# Patient Record
Sex: MALE | Race: WHITE | Employment: OTHER | URBAN - METROPOLITAN AREA
[De-identification: names, ages, dates, MRNs, and addresses within clinical notes are randomized per-mention and may not be internally consistent; named-entity substitution may affect disease eponyms.]

---

## 2017-05-22 ENCOUNTER — HOSPITAL ENCOUNTER (OUTPATIENT)
Dept: LAB | Age: 72
Discharge: HOME | End: 2017-05-22
Payer: MEDICARE

## 2017-05-22 ENCOUNTER — HOSPITAL ENCOUNTER (OUTPATIENT)
Dept: OTHER | Age: 72
Discharge: HOME | End: 2017-05-22
Payer: MEDICARE

## 2017-05-22 ENCOUNTER — HOSPITAL ENCOUNTER (OUTPATIENT)
Dept: PREADMISSION TESTING | Age: 72
Discharge: HOME | End: 2017-05-22
Payer: MEDICARE

## 2017-05-22 DIAGNOSIS — M51.26 DISPLACEMENT OF LUMBAR INTERVERTEBRAL DISC WITHOUT MYELOPATHY: ICD-10-CM

## 2017-05-22 DIAGNOSIS — Z01.811 PRE-OP CHEST EXAM: ICD-10-CM

## 2017-05-22 DIAGNOSIS — Z01.818 PRE-OP TESTING: ICD-10-CM

## 2017-05-22 LAB
ABO + RH BLD: NORMAL
ALBUMIN SERPL BCP-MCNC: 3.9 G/DL (ref 3.4–5)
ALBUMIN/GLOB SERPL: 1.4 {RATIO} (ref 0.8–1.7)
ALP SERPL-CCNC: 67 U/L (ref 45–117)
ALT SERPL-CCNC: 36 U/L (ref 16–61)
ANION GAP BLD CALC-SCNC: 11 MMOL/L (ref 3–18)
APPEARANCE UR: CLEAR
APTT PPP: 22.2 SEC (ref 23–36.4)
AST SERPL W P-5'-P-CCNC: 22 U/L (ref 15–37)
ATRIAL RATE: 109 BPM
BASOPHILS # BLD AUTO: 0.1 K/UL (ref 0–0.06)
BASOPHILS # BLD: 1 % (ref 0–2)
BILIRUB SERPL-MCNC: 0.8 MG/DL (ref 0.2–1)
BILIRUB UR QL: ABNORMAL
BLOOD GROUP ANTIBODIES SERPL: NORMAL
BUN SERPL-MCNC: 22 MG/DL (ref 7–18)
BUN/CREAT SERPL: 18 (ref 12–20)
CALCIUM SERPL-MCNC: 9.6 MG/DL (ref 8.5–10.1)
CALCULATED P AXIS, ECG09: 66 DEGREES
CALCULATED R AXIS, ECG10: 12 DEGREES
CALCULATED T AXIS, ECG11: 51 DEGREES
CHLORIDE SERPL-SCNC: 101 MMOL/L (ref 100–108)
CO2 SERPL-SCNC: 23 MMOL/L (ref 21–32)
COLOR UR: ABNORMAL
CREAT SERPL-MCNC: 1.25 MG/DL (ref 0.6–1.3)
DIAGNOSIS, 93000: NORMAL
DIFFERENTIAL METHOD BLD: ABNORMAL
EOSINOPHIL # BLD: 0.2 K/UL (ref 0–0.4)
EOSINOPHIL NFR BLD: 3 % (ref 0–5)
ERYTHROCYTE [DISTWIDTH] IN BLOOD BY AUTOMATED COUNT: 13 % (ref 11.6–14.5)
GLOBULIN SER CALC-MCNC: 2.7 G/DL (ref 2–4)
GLUCOSE SERPL-MCNC: 220 MG/DL (ref 74–99)
GLUCOSE UR STRIP.AUTO-MCNC: >1000 MG/DL
HCT VFR BLD AUTO: 44.2 % (ref 36–48)
HGB BLD-MCNC: 15.2 G/DL (ref 13–16)
HGB UR QL STRIP: NEGATIVE
INR PPP: 0.9 (ref 0.8–1.2)
KETONES UR QL STRIP.AUTO: ABNORMAL MG/DL
LEUKOCYTE ESTERASE UR QL STRIP.AUTO: NEGATIVE
LYMPHOCYTES # BLD AUTO: 17 % (ref 21–52)
LYMPHOCYTES # BLD: 1.2 K/UL (ref 0.9–3.6)
MCH RBC QN AUTO: 31.8 PG (ref 24–34)
MCHC RBC AUTO-ENTMCNC: 34.4 G/DL (ref 31–37)
MCV RBC AUTO: 92.5 FL (ref 74–97)
MONOCYTES # BLD: 0.6 K/UL (ref 0.05–1.2)
MONOCYTES NFR BLD AUTO: 8 % (ref 3–10)
NEUTS SEG # BLD: 5.4 K/UL (ref 1.8–8)
NEUTS SEG NFR BLD AUTO: 71 % (ref 40–73)
NITRITE UR QL STRIP.AUTO: NEGATIVE
P-R INTERVAL, ECG05: 158 MS
PH UR STRIP: 5 [PH] (ref 5–8)
PLATELET # BLD AUTO: 190 K/UL (ref 135–420)
PMV BLD AUTO: 11.5 FL (ref 9.2–11.8)
POTASSIUM SERPL-SCNC: 4.6 MMOL/L (ref 3.5–5.5)
PROT SERPL-MCNC: 6.6 G/DL (ref 6.4–8.2)
PROT UR STRIP-MCNC: NEGATIVE MG/DL
PROTHROMBIN TIME: 11.4 SEC (ref 11.5–15.2)
Q-T INTERVAL, ECG07: 318 MS
QRS DURATION, ECG06: 68 MS
QTC CALCULATION (BEZET), ECG08: 428 MS
RBC # BLD AUTO: 4.78 M/UL (ref 4.7–5.5)
SODIUM SERPL-SCNC: 135 MMOL/L (ref 136–145)
SP GR UR REFRACTOMETRY: >1.03 (ref 1–1.03)
SPECIMEN EXP DATE BLD: NORMAL
UROBILINOGEN UR QL STRIP.AUTO: 1 EU/DL (ref 0.2–1)
VENTRICULAR RATE, ECG03: 109 BPM
WBC # BLD AUTO: 7.5 K/UL (ref 4.6–13.2)

## 2017-05-22 PROCEDURE — 85610 PROTHROMBIN TIME: CPT | Performed by: NEUROLOGICAL SURGERY

## 2017-05-22 PROCEDURE — 86900 BLOOD TYPING SEROLOGIC ABO: CPT | Performed by: NEUROLOGICAL SURGERY

## 2017-05-22 PROCEDURE — 36415 COLL VENOUS BLD VENIPUNCTURE: CPT | Performed by: NEUROLOGICAL SURGERY

## 2017-05-22 PROCEDURE — 81003 URINALYSIS AUTO W/O SCOPE: CPT | Performed by: NEUROLOGICAL SURGERY

## 2017-05-22 PROCEDURE — 93005 ELECTROCARDIOGRAM TRACING: CPT

## 2017-05-22 PROCEDURE — 85730 THROMBOPLASTIN TIME PARTIAL: CPT | Performed by: NEUROLOGICAL SURGERY

## 2017-05-22 PROCEDURE — 80053 COMPREHEN METABOLIC PANEL: CPT | Performed by: NEUROLOGICAL SURGERY

## 2017-05-22 PROCEDURE — 87086 URINE CULTURE/COLONY COUNT: CPT | Performed by: NEUROLOGICAL SURGERY

## 2017-05-22 PROCEDURE — 71020 XR CHEST PA LAT: CPT

## 2017-05-22 PROCEDURE — 85025 COMPLETE CBC W/AUTO DIFF WBC: CPT | Performed by: NEUROLOGICAL SURGERY

## 2017-05-22 RX ORDER — METFORMIN HYDROCHLORIDE 1000 MG/1
1000 TABLET ORAL 2 TIMES DAILY
COMMUNITY

## 2017-05-22 RX ORDER — LEVOTHYROXINE SODIUM 175 UG/1
175 TABLET ORAL
COMMUNITY
End: 2018-02-19

## 2017-05-22 NOTE — PERIOP NOTES
PAT - SURGICAL PRE-ADMISSION INSTRUCTIONS    NAME:  Delores Weathers                                                          TODAY'S DATE:  5/22/2017    SURGERY DATE:  5/30/2017                                  SURGERY ARRIVAL TIME:   0830    1. Do NOT eat or drink anything, including candy or gum, after MIDNIGHT on 05/29/17 , unless you have specific instructions from your Surgeon or Anesthesia Provider to do so. 2. No smoking on the day of surgery. 3. No alcohol 24 hours prior to the day of surgery. 4. No recreational drugs for one week prior to the day of surgery. 5. Leave all valuables, including money/purse, at home. 6. Remove all jewelry, nail polish, makeup (including mascara); no lotions, powders, deodorant, or perfume/cologne/after shave. 7. Glasses/Contact lenses and Dentures may be worn to the hospital.  They will be removed prior to surgery. 8. Call your doctor if symptoms of a cold or illness develop within 24 ours prior to surgery. 9. AN ADULT MUST DRIVE YOU HOME AFTER OUTPATIENT SURGERY. 10. If you are having an OUTPATIENT procedure, please make arrangements for a responsible adult to be with you for 24 hours after your surgery. 11. If you are admitted to the hospital, you will be assigned to a bed after surgery is complete. Normally a family member will not be able to see you until you are in your assigned bed. 15. Family is encouraged to accompany you to the hospital.  We ask visitors in the treatment area to be limited to ONE person at a time to ensure patient privacy. EXCEPTIONS WILL BE MADE AS NEEDED. 15. Children under 12 are discouraged from entering the treatment area and need to be supervised by an adult when in the waiting room. Special Instructions:    HOLD oral diabetic medication on the MORNING OF surgery. , HOLD metformin/glucophage dose starting the EVENING BEFORE the day of surgery.     Patient Prep:    use CHG solution    These surgical instructions were reviewed with Navin Mora during the PAT visit. A printed copy of the instructions was provided to Navin Cons. Directions: On the morning of surgery, please go to the [facility]. Enter the building from the [facility] entrance, 1st floor (next to the Emergency Room entrance). Take the elevator to the 2nd floor. Sign in at the Registration Desk.     If you have any questions and/or concerns, please do not hesitate to call:  (Prior to the day of surgery)  [facility] unit:  [phone]  (Day of surgery)  [facility] unit:  [phone]

## 2017-05-23 LAB
BACTERIA SPEC CULT: NORMAL
SERVICE CMNT-IMP: NORMAL

## 2017-05-26 ENCOUNTER — ANESTHESIA EVENT (OUTPATIENT)
Dept: SURGERY | Age: 72
End: 2017-05-26
Payer: MEDICARE

## 2017-05-30 ENCOUNTER — HOSPITAL ENCOUNTER (OUTPATIENT)
Age: 72
Setting detail: OBSERVATION
Discharge: HOME HEALTH | End: 2017-05-31
Attending: NEUROLOGICAL SURGERY | Admitting: NEUROLOGICAL SURGERY
Payer: MEDICARE

## 2017-05-30 ENCOUNTER — APPOINTMENT (OUTPATIENT)
Dept: GENERAL RADIOLOGY | Age: 72
End: 2017-05-30
Attending: NEUROLOGICAL SURGERY
Payer: MEDICARE

## 2017-05-30 ENCOUNTER — ANESTHESIA (OUTPATIENT)
Dept: SURGERY | Age: 72
End: 2017-05-30
Payer: MEDICARE

## 2017-05-30 PROBLEM — M51.16 LUMBAR DISC HERNIATION WITH RADICULOPATHY: Status: ACTIVE | Noted: 2017-05-30

## 2017-05-30 PROBLEM — M41.9 SCOLIOSIS DEFORMITY OF SPINE: Status: ACTIVE | Noted: 2017-05-30

## 2017-05-30 LAB
GLUCOSE BLD STRIP.AUTO-MCNC: 109 MG/DL (ref 70–110)
GLUCOSE BLD STRIP.AUTO-MCNC: 154 MG/DL (ref 70–110)

## 2017-05-30 PROCEDURE — 74011250636 HC RX REV CODE- 250/636

## 2017-05-30 PROCEDURE — 77030019908 HC STETH ESOPH SIMS -A: Performed by: NURSE ANESTHETIST, CERTIFIED REGISTERED

## 2017-05-30 PROCEDURE — 74011250637 HC RX REV CODE- 250/637: Performed by: ANESTHESIOLOGY

## 2017-05-30 PROCEDURE — 77030004391 HC BUR FLUT MEDT -C: Performed by: NEUROLOGICAL SURGERY

## 2017-05-30 PROCEDURE — 77030008683 HC TU ET CUF COVD -A: Performed by: NURSE ANESTHETIST, CERTIFIED REGISTERED

## 2017-05-30 PROCEDURE — 77030033138 HC SUT PGA STRATFX J&J -B: Performed by: NEUROLOGICAL SURGERY

## 2017-05-30 PROCEDURE — 77030018673: Performed by: NEUROLOGICAL SURGERY

## 2017-05-30 PROCEDURE — 77030034479 HC ADH SKN CLSR PRINEO J&J -B: Performed by: NEUROLOGICAL SURGERY

## 2017-05-30 PROCEDURE — 77030013079 HC BLNKT BAIR HGGR 3M -A: Performed by: NURSE ANESTHETIST, CERTIFIED REGISTERED

## 2017-05-30 PROCEDURE — 74011250636 HC RX REV CODE- 250/636: Performed by: NURSE ANESTHETIST, CERTIFIED REGISTERED

## 2017-05-30 PROCEDURE — 77030018846 HC SOL IRR STRL H20 ICUM -A: Performed by: NEUROLOGICAL SURGERY

## 2017-05-30 PROCEDURE — 74011000272 HC RX REV CODE- 272: Performed by: NEUROLOGICAL SURGERY

## 2017-05-30 PROCEDURE — 77030008477 HC STYL SATN SLP COVD -A: Performed by: NURSE ANESTHETIST, CERTIFIED REGISTERED

## 2017-05-30 PROCEDURE — 77030027138 HC INCENT SPIROMETER -A

## 2017-05-30 PROCEDURE — 74011250637 HC RX REV CODE- 250/637: Performed by: NEUROLOGICAL SURGERY

## 2017-05-30 PROCEDURE — 74011636637 HC RX REV CODE- 636/637: Performed by: NURSE ANESTHETIST, CERTIFIED REGISTERED

## 2017-05-30 PROCEDURE — 77030032490 HC SLV COMPR SCD KNE COVD -B: Performed by: NEUROLOGICAL SURGERY

## 2017-05-30 PROCEDURE — 77030003029 HC SUT VCRL J&J -B: Performed by: NEUROLOGICAL SURGERY

## 2017-05-30 PROCEDURE — 76010000162 HC OR TIME 1.5 TO 2 HR INTENSV-TIER 1: Performed by: NEUROLOGICAL SURGERY

## 2017-05-30 PROCEDURE — 74011000250 HC RX REV CODE- 250: Performed by: NEUROLOGICAL SURGERY

## 2017-05-30 PROCEDURE — 65270000029 HC RM PRIVATE

## 2017-05-30 PROCEDURE — 74011250637 HC RX REV CODE- 250/637: Performed by: NURSE ANESTHETIST, CERTIFIED REGISTERED

## 2017-05-30 PROCEDURE — 65390000012 HC CONDITION CODE 44 OBSERVATION

## 2017-05-30 PROCEDURE — 77030003028 HC SUT VCRL J&J -A: Performed by: NEUROLOGICAL SURGERY

## 2017-05-30 PROCEDURE — 77030020486 HC ELECTRD EMG KT NUVA -G1: Performed by: NEUROLOGICAL SURGERY

## 2017-05-30 PROCEDURE — 74011000250 HC RX REV CODE- 250

## 2017-05-30 PROCEDURE — C1729 CATH, DRAINAGE: HCPCS | Performed by: NEUROLOGICAL SURGERY

## 2017-05-30 PROCEDURE — 77030011640 HC PAD GRND REM COVD -A: Performed by: NEUROLOGICAL SURGERY

## 2017-05-30 PROCEDURE — 76210000016 HC OR PH I REC 1 TO 1.5 HR: Performed by: NEUROLOGICAL SURGERY

## 2017-05-30 PROCEDURE — 77030014647 HC SEAL FBRN TISSL BAXT -D: Performed by: NEUROLOGICAL SURGERY

## 2017-05-30 PROCEDURE — 74011250636 HC RX REV CODE- 250/636: Performed by: NEUROLOGICAL SURGERY

## 2017-05-30 PROCEDURE — 82962 GLUCOSE BLOOD TEST: CPT

## 2017-05-30 PROCEDURE — 74011250636 HC RX REV CODE- 250/636: Performed by: ANESTHESIOLOGY

## 2017-05-30 PROCEDURE — 74011000258 HC RX REV CODE- 258: Performed by: NEUROLOGICAL SURGERY

## 2017-05-30 PROCEDURE — 77010033678 HC OXYGEN DAILY

## 2017-05-30 PROCEDURE — 77030018836 HC SOL IRR NACL ICUM -A: Performed by: NEUROLOGICAL SURGERY

## 2017-05-30 PROCEDURE — 76060000034 HC ANESTHESIA 1.5 TO 2 HR: Performed by: NEUROLOGICAL SURGERY

## 2017-05-30 RX ORDER — SODIUM CHLORIDE 0.9 % (FLUSH) 0.9 %
5-10 SYRINGE (ML) INJECTION EVERY 8 HOURS
Status: DISCONTINUED | OUTPATIENT
Start: 2017-05-30 | End: 2017-05-31 | Stop reason: HOSPADM

## 2017-05-30 RX ORDER — SODIUM CHLORIDE 0.9 % (FLUSH) 0.9 %
5-10 SYRINGE (ML) INJECTION AS NEEDED
Status: DISCONTINUED | OUTPATIENT
Start: 2017-05-30 | End: 2017-05-31 | Stop reason: HOSPADM

## 2017-05-30 RX ORDER — FENTANYL CITRATE 50 UG/ML
50 INJECTION, SOLUTION INTRAMUSCULAR; INTRAVENOUS AS NEEDED
Status: DISCONTINUED | OUTPATIENT
Start: 2017-05-30 | End: 2017-05-31 | Stop reason: HOSPADM

## 2017-05-30 RX ORDER — DEXTROSE MONOHYDRATE 25 G/50ML
25-50 INJECTION, SOLUTION INTRAVENOUS AS NEEDED
Status: DISCONTINUED | OUTPATIENT
Start: 2017-05-30 | End: 2017-05-31 | Stop reason: HOSPADM

## 2017-05-30 RX ORDER — PREGABALIN 50 MG/1
50 CAPSULE ORAL ONCE
Status: COMPLETED | OUTPATIENT
Start: 2017-05-30 | End: 2017-05-30

## 2017-05-30 RX ORDER — VANCOMYCIN HYDROCHLORIDE 1 G/20ML
INJECTION, POWDER, LYOPHILIZED, FOR SOLUTION INTRAVENOUS AS NEEDED
Status: DISCONTINUED | OUTPATIENT
Start: 2017-05-30 | End: 2017-05-30 | Stop reason: HOSPADM

## 2017-05-30 RX ORDER — SODIUM CHLORIDE, SODIUM LACTATE, POTASSIUM CHLORIDE, CALCIUM CHLORIDE 600; 310; 30; 20 MG/100ML; MG/100ML; MG/100ML; MG/100ML
25 INJECTION, SOLUTION INTRAVENOUS CONTINUOUS
Status: DISCONTINUED | OUTPATIENT
Start: 2017-05-30 | End: 2017-05-30 | Stop reason: HOSPADM

## 2017-05-30 RX ORDER — LIDOCAINE HYDROCHLORIDE AND EPINEPHRINE 10; 10 MG/ML; UG/ML
INJECTION, SOLUTION INFILTRATION; PERINEURAL AS NEEDED
Status: DISCONTINUED | OUTPATIENT
Start: 2017-05-30 | End: 2017-05-30 | Stop reason: HOSPADM

## 2017-05-30 RX ORDER — CEFAZOLIN SODIUM 2 G/50ML
2 SOLUTION INTRAVENOUS ONCE
Status: COMPLETED | OUTPATIENT
Start: 2017-05-30 | End: 2017-05-30

## 2017-05-30 RX ORDER — DIAZEPAM 10 MG/2ML
5 INJECTION INTRAMUSCULAR ONCE
Status: COMPLETED | OUTPATIENT
Start: 2017-05-30 | End: 2017-05-30

## 2017-05-30 RX ORDER — ONDANSETRON 2 MG/ML
4 INJECTION INTRAMUSCULAR; INTRAVENOUS ONCE
Status: ACTIVE | OUTPATIENT
Start: 2017-05-30 | End: 2017-05-31

## 2017-05-30 RX ORDER — HYDROCODONE BITARTRATE AND ACETAMINOPHEN 5; 325 MG/1; MG/1
1 TABLET ORAL
Status: DISCONTINUED | OUTPATIENT
Start: 2017-05-30 | End: 2017-05-31 | Stop reason: HOSPADM

## 2017-05-30 RX ORDER — METFORMIN HYDROCHLORIDE 500 MG/1
1000 TABLET ORAL 2 TIMES DAILY
Status: DISCONTINUED | OUTPATIENT
Start: 2017-05-30 | End: 2017-05-31 | Stop reason: HOSPADM

## 2017-05-30 RX ORDER — HYDROMORPHONE HYDROCHLORIDE 2 MG/ML
0.5 INJECTION, SOLUTION INTRAMUSCULAR; INTRAVENOUS; SUBCUTANEOUS
Status: DISCONTINUED | OUTPATIENT
Start: 2017-05-30 | End: 2017-05-31 | Stop reason: HOSPADM

## 2017-05-30 RX ORDER — ROSUVASTATIN CALCIUM 10 MG/1
5 TABLET, COATED ORAL
Status: DISCONTINUED | OUTPATIENT
Start: 2017-05-30 | End: 2017-05-31 | Stop reason: HOSPADM

## 2017-05-30 RX ORDER — BUPIVACAINE HYDROCHLORIDE AND EPINEPHRINE 2.5; 5 MG/ML; UG/ML
INJECTION, SOLUTION EPIDURAL; INFILTRATION; INTRACAUDAL; PERINEURAL AS NEEDED
Status: DISCONTINUED | OUTPATIENT
Start: 2017-05-30 | End: 2017-05-30 | Stop reason: HOSPADM

## 2017-05-30 RX ORDER — SUCCINYLCHOLINE CHLORIDE 20 MG/ML
INJECTION INTRAMUSCULAR; INTRAVENOUS AS NEEDED
Status: DISCONTINUED | OUTPATIENT
Start: 2017-05-30 | End: 2017-05-30 | Stop reason: HOSPADM

## 2017-05-30 RX ORDER — PROPOFOL 10 MG/ML
INJECTION, EMULSION INTRAVENOUS AS NEEDED
Status: DISCONTINUED | OUTPATIENT
Start: 2017-05-30 | End: 2017-05-30 | Stop reason: HOSPADM

## 2017-05-30 RX ORDER — DIPHENHYDRAMINE HCL 25 MG
25 CAPSULE ORAL
Status: DISCONTINUED | OUTPATIENT
Start: 2017-05-30 | End: 2017-05-31 | Stop reason: HOSPADM

## 2017-05-30 RX ORDER — ONDANSETRON 2 MG/ML
INJECTION INTRAMUSCULAR; INTRAVENOUS AS NEEDED
Status: DISCONTINUED | OUTPATIENT
Start: 2017-05-30 | End: 2017-05-30 | Stop reason: HOSPADM

## 2017-05-30 RX ORDER — PANTOPRAZOLE SODIUM 40 MG/1
40 TABLET, DELAYED RELEASE ORAL DAILY
Status: DISCONTINUED | OUTPATIENT
Start: 2017-05-31 | End: 2017-05-31 | Stop reason: HOSPADM

## 2017-05-30 RX ORDER — LIDOCAINE HYDROCHLORIDE 10 MG/ML
0.1 INJECTION, SOLUTION EPIDURAL; INFILTRATION; INTRACAUDAL; PERINEURAL AS NEEDED
Status: DISCONTINUED | OUTPATIENT
Start: 2017-05-30 | End: 2017-05-30 | Stop reason: HOSPADM

## 2017-05-30 RX ORDER — GLIMEPIRIDE 2 MG/1
1 TABLET ORAL 2 TIMES DAILY
Status: DISCONTINUED | OUTPATIENT
Start: 2017-05-30 | End: 2017-05-31 | Stop reason: HOSPADM

## 2017-05-30 RX ORDER — MAGNESIUM SULFATE 100 %
4 CRYSTALS MISCELLANEOUS AS NEEDED
Status: DISCONTINUED | OUTPATIENT
Start: 2017-05-30 | End: 2017-05-31 | Stop reason: HOSPADM

## 2017-05-30 RX ORDER — HYDROMORPHONE HYDROCHLORIDE 1 MG/ML
INJECTION, SOLUTION INTRAMUSCULAR; INTRAVENOUS; SUBCUTANEOUS
Status: COMPLETED
Start: 2017-05-30 | End: 2017-05-30

## 2017-05-30 RX ORDER — HYDROMORPHONE HYDROCHLORIDE 1 MG/ML
1 INJECTION, SOLUTION INTRAMUSCULAR; INTRAVENOUS; SUBCUTANEOUS
Status: DISCONTINUED | OUTPATIENT
Start: 2017-05-30 | End: 2017-05-31 | Stop reason: HOSPADM

## 2017-05-30 RX ORDER — BISACODYL 5 MG
5 TABLET, DELAYED RELEASE (ENTERIC COATED) ORAL DAILY PRN
Status: DISCONTINUED | OUTPATIENT
Start: 2017-05-30 | End: 2017-05-31 | Stop reason: HOSPADM

## 2017-05-30 RX ORDER — INSULIN LISPRO 100 [IU]/ML
INJECTION, SOLUTION INTRAVENOUS; SUBCUTANEOUS ONCE
Status: COMPLETED | OUTPATIENT
Start: 2017-05-30 | End: 2017-05-30

## 2017-05-30 RX ORDER — SODIUM CHLORIDE, SODIUM LACTATE, POTASSIUM CHLORIDE, CALCIUM CHLORIDE 600; 310; 30; 20 MG/100ML; MG/100ML; MG/100ML; MG/100ML
50 INJECTION, SOLUTION INTRAVENOUS CONTINUOUS
Status: DISCONTINUED | OUTPATIENT
Start: 2017-05-30 | End: 2017-05-31 | Stop reason: HOSPADM

## 2017-05-30 RX ORDER — FAMOTIDINE 20 MG/1
20 TABLET, FILM COATED ORAL ONCE
Status: COMPLETED | OUTPATIENT
Start: 2017-05-30 | End: 2017-05-30

## 2017-05-30 RX ORDER — MIDAZOLAM HYDROCHLORIDE 1 MG/ML
INJECTION, SOLUTION INTRAMUSCULAR; INTRAVENOUS AS NEEDED
Status: DISCONTINUED | OUTPATIENT
Start: 2017-05-30 | End: 2017-05-30 | Stop reason: HOSPADM

## 2017-05-30 RX ORDER — ACETAMINOPHEN 500 MG
1000 TABLET ORAL ONCE
Status: COMPLETED | OUTPATIENT
Start: 2017-05-30 | End: 2017-05-30

## 2017-05-30 RX ORDER — FENTANYL CITRATE 50 UG/ML
INJECTION, SOLUTION INTRAMUSCULAR; INTRAVENOUS AS NEEDED
Status: DISCONTINUED | OUTPATIENT
Start: 2017-05-30 | End: 2017-05-30 | Stop reason: HOSPADM

## 2017-05-30 RX ORDER — INSULIN LISPRO 100 [IU]/ML
INJECTION, SOLUTION INTRAVENOUS; SUBCUTANEOUS ONCE
Status: ACTIVE | OUTPATIENT
Start: 2017-05-30 | End: 2017-05-31

## 2017-05-30 RX ORDER — HYDROMORPHONE HYDROCHLORIDE 1 MG/ML
INJECTION, SOLUTION INTRAMUSCULAR; INTRAVENOUS; SUBCUTANEOUS AS NEEDED
Status: DISCONTINUED | OUTPATIENT
Start: 2017-05-30 | End: 2017-05-30 | Stop reason: HOSPADM

## 2017-05-30 RX ORDER — TELMISARTAN 40 MG/1
20 TABLET ORAL DAILY
Status: DISCONTINUED | OUTPATIENT
Start: 2017-05-31 | End: 2017-05-31 | Stop reason: HOSPADM

## 2017-05-30 RX ORDER — TIZANIDINE 2 MG/1
2 TABLET ORAL
Status: DISCONTINUED | OUTPATIENT
Start: 2017-05-30 | End: 2017-05-31 | Stop reason: HOSPADM

## 2017-05-30 RX ORDER — LIDOCAINE HYDROCHLORIDE 20 MG/ML
INJECTION, SOLUTION EPIDURAL; INFILTRATION; INTRACAUDAL; PERINEURAL AS NEEDED
Status: DISCONTINUED | OUTPATIENT
Start: 2017-05-30 | End: 2017-05-30 | Stop reason: HOSPADM

## 2017-05-30 RX ORDER — SODIUM CHLORIDE AND POTASSIUM CHLORIDE .9; .15 G/100ML; G/100ML
SOLUTION INTRAVENOUS CONTINUOUS
Status: DISCONTINUED | OUTPATIENT
Start: 2017-05-30 | End: 2017-05-31 | Stop reason: HOSPADM

## 2017-05-30 RX ADMIN — DIAZEPAM 5 MG: 5 INJECTION, SOLUTION INTRAMUSCULAR; INTRAVENOUS at 12:14

## 2017-05-30 RX ADMIN — SODIUM CHLORIDE AND POTASSIUM CHLORIDE: 9; 1.49 INJECTION, SOLUTION INTRAVENOUS at 19:51

## 2017-05-30 RX ADMIN — ONDANSETRON 4 MG: 2 INJECTION INTRAMUSCULAR; INTRAVENOUS at 14:56

## 2017-05-30 RX ADMIN — CEFAZOLIN SODIUM 2 G: 2 SOLUTION INTRAVENOUS at 13:54

## 2017-05-30 RX ADMIN — CEFAZOLIN SODIUM 1 G: 1 INJECTION, POWDER, FOR SOLUTION INTRAMUSCULAR; INTRAVENOUS at 22:30

## 2017-05-30 RX ADMIN — HYDROMORPHONE HYDROCHLORIDE 0.5 MG: 1 INJECTION, SOLUTION INTRAMUSCULAR; INTRAVENOUS; SUBCUTANEOUS at 16:25

## 2017-05-30 RX ADMIN — SODIUM CHLORIDE AND POTASSIUM CHLORIDE: 9; 1.49 INJECTION, SOLUTION INTRAVENOUS at 18:51

## 2017-05-30 RX ADMIN — SODIUM CHLORIDE, SODIUM LACTATE, POTASSIUM CHLORIDE, AND CALCIUM CHLORIDE 50 ML/HR: 600; 310; 30; 20 INJECTION, SOLUTION INTRAVENOUS at 15:50

## 2017-05-30 RX ADMIN — HYDROMORPHONE HYDROCHLORIDE 0.2 MG: 1 INJECTION, SOLUTION INTRAMUSCULAR; INTRAVENOUS; SUBCUTANEOUS at 15:28

## 2017-05-30 RX ADMIN — PREGABALIN 50 MG: 50 CAPSULE ORAL at 12:13

## 2017-05-30 RX ADMIN — ROSUVASTATIN CALCIUM 5 MG: 10 TABLET, FILM COATED ORAL at 22:35

## 2017-05-30 RX ADMIN — ACETAMINOPHEN 1000 MG: 500 TABLET ORAL at 12:13

## 2017-05-30 RX ADMIN — HYDROMORPHONE HYDROCHLORIDE 1 MG: 1 INJECTION, SOLUTION INTRAMUSCULAR; INTRAVENOUS; SUBCUTANEOUS at 23:59

## 2017-05-30 RX ADMIN — SODIUM CHLORIDE, SODIUM LACTATE, POTASSIUM CHLORIDE, AND CALCIUM CHLORIDE 25 ML/HR: 600; 310; 30; 20 INJECTION, SOLUTION INTRAVENOUS at 09:43

## 2017-05-30 RX ADMIN — METFORMIN HYDROCHLORIDE 1000 MG: 500 TABLET, FILM COATED ORAL at 19:51

## 2017-05-30 RX ADMIN — FENTANYL CITRATE 100 MCG: 50 INJECTION, SOLUTION INTRAMUSCULAR; INTRAVENOUS at 13:44

## 2017-05-30 RX ADMIN — FENTANYL CITRATE 50 MCG: 50 INJECTION, SOLUTION INTRAMUSCULAR; INTRAVENOUS at 15:41

## 2017-05-30 RX ADMIN — FENTANYL CITRATE 50 MCG: 50 INJECTION, SOLUTION INTRAMUSCULAR; INTRAVENOUS at 15:51

## 2017-05-30 RX ADMIN — PROPOFOL 200 MG: 10 INJECTION, EMULSION INTRAVENOUS at 13:44

## 2017-05-30 RX ADMIN — FENTANYL CITRATE 50 MCG: 50 INJECTION, SOLUTION INTRAMUSCULAR; INTRAVENOUS at 15:23

## 2017-05-30 RX ADMIN — FAMOTIDINE 20 MG: 20 TABLET, FILM COATED ORAL at 09:45

## 2017-05-30 RX ADMIN — HYDROMORPHONE HYDROCHLORIDE 0.5 MG: 1 INJECTION, SOLUTION INTRAMUSCULAR; INTRAVENOUS; SUBCUTANEOUS at 16:02

## 2017-05-30 RX ADMIN — LIDOCAINE HYDROCHLORIDE 60 MG: 20 INJECTION, SOLUTION EPIDURAL; INFILTRATION; INTRACAUDAL; PERINEURAL at 13:44

## 2017-05-30 RX ADMIN — MIDAZOLAM HYDROCHLORIDE 2 MG: 1 INJECTION, SOLUTION INTRAMUSCULAR; INTRAVENOUS at 13:41

## 2017-05-30 RX ADMIN — HYDROMORPHONE HYDROCHLORIDE 0.4 MG: 1 INJECTION, SOLUTION INTRAMUSCULAR; INTRAVENOUS; SUBCUTANEOUS at 14:18

## 2017-05-30 RX ADMIN — SUCCINYLCHOLINE CHLORIDE 100 MG: 20 INJECTION INTRAMUSCULAR; INTRAVENOUS at 13:44

## 2017-05-30 RX ADMIN — INSULIN LISPRO 2 UNITS: 100 INJECTION, SOLUTION INTRAVENOUS; SUBCUTANEOUS at 09:54

## 2017-05-30 RX ADMIN — HYDROMORPHONE HYDROCHLORIDE 0.4 MG: 1 INJECTION, SOLUTION INTRAMUSCULAR; INTRAVENOUS; SUBCUTANEOUS at 15:26

## 2017-05-30 NOTE — OP NOTES
Christo Sen    Name:  Sonido Ross  MR#:  [id number]  :  1945  Account #:  [de-identified]  Date of Adm:  2017  Date of Surgery:  2017      PREOPERATIVE DIAGNOSES  1. Left L4-L5 herniated nucleus pulposus. 2. Scoliosis. POSTOPERATIVE DIAGNOSES  1. Left L4-L5 herniated nucleus pulposus. 2. Scoliosis. PROCEDURES PERFORMED: Left L4-5 diskectomy. SURGEON: Carmen Gomez MD    ASSISTANT: Bella Gutierrez    ANESTHESIA: General endotracheal.    ESTIMATED BLOOD LOSS: Minimal.    COMPLICATIONS: No complications. SPECIMENS REMOVED: none    BRIEF HISTORY: (Please see admitting history and physical for full  details). This patient is a 79-year-old gentleman who presents with  intractable left lower extremity pain. He has a history of L3-L4  diskectomy followed by fusion. He had good relief of pain until recently. MRI revealed disk herniation which is enlarged at L4-5 on the left. Significant nerve root compromise. He has again failed conservative  therapy. DESCRIPTION OF PROCEDURE: After informed consent was  obtained, the patient was taken back to the operating room and placed  under general anesthesia without event. A Matias catheter was placed  due to the brevity of the operation. Impulse provided monitoring and  placed the electrodes. He was rolled in prone position on the operating  room table with his arms in the up position. All pressure points were  padded. The back was shaved with an electric razor. It was cleaned with  alcohol soaked 4 x 4's. A chlorhexidine scrub was performed and  blotted dry with a sterile towel. ChloraPrep was applied and allowed to  dry. Based on imaging, and the position of the incision, the decision  was made to proceed with a midline approach, mini open. The area  was infiltrated with 1% lidocaine with epinephrine.  A #10 blade was  used to incise the skin right over L4-5 as judged by fluoroscopy. Subperiosteal takedown was carried out. A small amount of the medial  facet was taken. Every attempt was made to preserve as much bone  as possible. Laminotomy was performed. Ligamentectomy was  performed. Under the operating microscope, the nerve root was  retracted and disk protrusion was seen. It was incised in a longitudinal  fashion and several medium sized fragments were removed. There  was one more that was more medial and this was gently dissected  away. At the end of the decompression, the nerve root was seen to lie  slack along its course. The annulus was tightened with the bipolar at  the end of the decompression. FloSeal was applied and the excess  was irrigated away. A small Gelfoam duvet was placed. The wound  was then closed with a row of simple interrupted 0 Vicryl sutures after  placing a small amount of vancomycin powder in the wound. The skin  was closed in layered fashion, and followed by Stratafix  in subcutaneous layer. Prineo was applied and allowed to dry. Marcaine was circumferentially injected around the wound. The patient  tolerated the procedure well. All counts were correct before closing. Free running EMGs were quiet.         MD CHARMAINE Radford / Sonja Moody  D:  05/30/2017   15:18  T:  05/30/2017   17:04  Job #:  [id number]

## 2017-05-30 NOTE — ANESTHESIA POSTPROCEDURE EVALUATION
Post-Anesthesia Evaluation and Assessment    Patient: Nathaniel Muro MRN: [id number]  SSN: [id number]    YOB: 1945  Age: 67 y.o. Sex: male       Cardiovascular Function/Vital Signs  Visit Vitals    /71    Pulse 78    Temp 36.6 °C (97.8 °F)    Resp 13    Ht 5' 10\" (1.778 m)    Wt 85.9 kg (189 lb 5 oz)    SpO2 96%    BMI 27.16 kg/m2       Patient is status post general anesthesia for Procedure(s):  left l4-5  MINIMALLY INVASIVE discectomy. Nausea/Vomiting: None    Postoperative hydration reviewed and adequate. Pain:  Pain Scale 1: Visual (05/30/17 1640)  Pain Intensity 1: 0 (05/30/17 1640)   Managed    Neurological Status:   Neuro (WDL): Within Defined Limits (05/30/17 1625)  Neuro  LUE Motor Response: Purposeful;Spontaneous  (05/30/17 1625)  LLE Motor Response: Purposeful;Spontaneous  (05/30/17 1625)  RUE Motor Response: Purposeful;Spontaneous  (05/30/17 1625)  RLE Motor Response: Purposeful;Spontaneous  (05/30/17 1625)   At baseline    Mental Status and Level of Consciousness: Alert and oriented     Pulmonary Status:   O2 Device: Room air (05/30/17 1555)   Adequate oxygenation and airway patent    Complications related to anesthesia: None    Post-anesthesia assessment completed.  No concerns    Signed By: Clyde Arenas CRNA     May 30, 2017

## 2017-05-30 NOTE — ROUTINE PROCESS
Bedside and Verbal shift change report given to orly cardenas   (oncoming nurse) by Elmira Alexander RN   (offgoing nurse). Report included the following information SBAR, Kardex, Intake/Output and Recent Results.

## 2017-05-30 NOTE — PROGRESS NOTES
1718  Received pt  From RR, alert and oriented, moving all extremities, denies tingling and numbness, due to void no pain at this time. 1853  Assisted to stand up at the  edge of the bed to void,can not  Void on the bed, walks few steps while assisting him back to bed, at this time sitting at the edge of the bed for dinner.

## 2017-05-30 NOTE — PERIOP NOTES
Pt arrived from OR via stretcher; NAD, VSS, breathing even and unlabored; connected to monitor. MAR and anesthesia reports received and acknowledged; will continue to monitor. Morenita [id number] - Attempted to update pt's spouse; volunteer stated that no one was answering when she called out; will try again shortly. [id number] - Able to update spouse, Harley Ferguson, with pt's condition and room #.     1700 - TRANSFER - OUT REPORT:    Verbal report given to REINIER Velasco (name) on [employer]  being transferred to 2200 (unit) for routine post - op       Report consisted of patients Situation, Background, Assessment and   Recommendations(SBAR). Information from the following report(s) SBAR, Procedure Summary, Intake/Output, MAR, Med Rec Status and Cardiac Rhythm normal sinus was reviewed with the receiving nurse. Lines:   Peripheral IV 05/30/17 Left Wrist (Active)   Site Assessment Clean, dry, & intact 5/30/2017  5:00 PM   Phlebitis Assessment 0 5/30/2017  5:00 PM   Infiltration Assessment 0 5/30/2017  5:00 PM   Dressing Status Clean, dry, & intact 5/30/2017  5:00 PM   Dressing Type Tape;Transparent 5/30/2017  5:00 PM   Hub Color/Line Status Pink; Infusing 5/30/2017  5:00 PM   Action Taken Open ports on tubing capped 5/30/2017  3:25 PM   Alcohol Cap Used Yes 5/30/2017  3:25 PM        Opportunity for questions and clarification was provided.       Patient transported with:   [facility]

## 2017-05-30 NOTE — PROGRESS NOTES
TRANSFER - IN REPORT:    Verbal report received from [facility] on Ray Neth  being received from [place] for routine post - op      Report consisted of patients Situation, Background, Assessment and   Recommendations(SBAR). Information from the following report(s) SBAR, Kardex, OR Summary, Intake/Output and MAR was reviewed with the receiving nurse. Opportunity for questions and clarification was provided. Assessment completed upon patients arrival to unit and care assumed. Pt received via bed, alert and oriented, able to turn in bed. Dressing on back dry, ice pack in place. Oriented to call bell,phone and incentive spirometer.

## 2017-05-30 NOTE — ANESTHESIA PREPROCEDURE EVALUATION
Anesthetic History   No history of anesthetic complications            Review of Systems / Medical History  Patient summary reviewed and pertinent labs reviewed    Pulmonary  Within defined limits      Sleep apnea           Neuro/Psych   Within defined limits           Cardiovascular  Within defined limits  Hypertension              Exercise tolerance: >4 METS     GI/Hepatic/Renal  Within defined limits   GERD           Endo/Other    Diabetes: type 2  Hypothyroidism  Arthritis     Other Findings   Comments:   Risk Factors for Postoperative nausea/vomiting:       History of postoperative nausea/vomiting? NO       Female? NO       Motion sickness? NO       Intended opioid administration for postoperative analgesia? YES      Smoking Abstinence  Current Smoker? NO  Elective Surgery? YES  Seen preoperatively by anesthesiologist or proxy prior to day of surgery? YES  Pt abstained from smoking 24 hours prior to anesthesia?  YES               Physical Exam    Airway  Mallampati: II  TM Distance: 4 - 6 cm  Neck ROM: normal range of motion   Mouth opening: Normal     Cardiovascular    Rhythm: regular  Rate: normal         Dental    Dentition: Poor dentition  Comments: Most teeth are ground down   Pulmonary  Breath sounds clear to auscultation               Abdominal  GI exam deferred       Other Findings            Anesthetic Plan    ASA: 3  Anesthesia type: general          Induction: Intravenous  Anesthetic plan and risks discussed with: Patient

## 2017-05-30 NOTE — IP AVS SNAPSHOT
[address] Patient: Arti House MRN: [id number] VQ You are allergic to the following Allergen Reactions Oxycodone Nausea and Vomiting Recent Documentation Height Weight BMI Smoking Status 1.778 m 85.9 kg 27.16 kg/m2 Former Smoker Emergency Contacts Name Discharge Info Relation Home Work Mobile Ada Vicente DISCHARGE CAREGIVER [3] Spouse [3]   [phone] About your hospitalization You were admitted on:  May 30, 2017 You last received care in the:  [address] [place] [address],[address] You were discharged on:  May 31, 2017 Unit phone number:  [phone] Why you were hospitalized Your primary diagnosis was:  Not on File Your diagnoses also included:  Scoliosis Deformity Of Spine, Lumbar Disc Herniation With Radiculopathy Providers Seen During Your Hospitalizations Provider Role Specialty Primary office phone Lane Quinones MD Attending Provider Neurosurgery [phone] Your Primary Care Physician (PCP) Primary Care Physician Office Phone Office Fax [facility] [phone] Follow-up Information Follow up With Details Comments Contact Info Margie Riley MD   [address] [place] [address] [phone] Current Discharge Medication List  
  
START taking these medications Dose & Instructions Dispensing Information Comments Morning Noon Evening Bedtime HYDROcodone-acetaminophen 5-325 mg per tablet Commonly known as:  Ceil Heap Your last dose was: Your next dose is:    
   
   
 Dose:  1 Tab Take 1 Tab by mouth every six (6) hours as needed. Max Daily Amount: 4 Tabs. Quantity:  28 Tab Refills:  None  
     
   
   
   
  
 tiZANidine 2 mg tablet Commonly known as:  Alysia Rye Your last dose was: Your next dose is:    
   
   
 Dose:  4 mg Take 2 Tabs by mouth every six (6) hours as needed. Indications: MUSCLE SPASM Quantity:  20 Tab Refills:  NONE CONTINUE these medications which have NOT CHANGED Dose & Instructions Dispensing Information Comments Morning Noon Evening Bedtime  
 aspirin 81 mg tablet Your last dose was: Your next dose is:    
   
   
 Dose:  81 mg Take 81 mg by mouth. Refills:  0 CENTRUM SILVER Tab tablet Generic drug:  multivitamins-minerals-lutein Your last dose was: Your next dose is: Take  by mouth. Refills:  0  
     
   
   
   
  
 CRESTOR 5 mg tablet Generic drug:  rosuvastatin Your last dose was: Your next dose is: Take  by mouth nightly. Refills:  0  
     
   
   
   
  
 FISH OIL 1,000 mg Cap Generic drug:  omega-3 fatty acids-vitamin e Your last dose was: Your next dose is:    
   
   
 Dose:  1 Cap Take 1 capsule by mouth. Refills:  0  
     
   
   
   
  
 glimepiride 1 mg tablet Commonly known as:  AMARYL Your last dose was: Your next dose is:    
   
   
 Dose:  1 mg Take 1 mg by mouth two (2) times a day. Refills:  0  
     
   
   
   
  
 levothyroxine 175 mcg tablet Commonly known as:  SYNTHROID Your last dose was: Your next dose is:    
   
   
 Dose:  175 mcg Take 175 mcg by mouth Daily (before breakfast). Refills:  0  
     
   
   
   
  
 metFORMIN 1,000 mg tablet Commonly known as:  GLUCOPHAGE Your last dose was: Your next dose is:    
   
   
 Dose:  1000 mg Take 1,000 mg by mouth two (2) times a day. Refills:  0 MICARDIS 20 mg tablet Generic drug:  telmisartan Your last dose was: Your next dose is: Take  by mouth daily. Refills:  0 MOTRIN 800 mg tablet Generic drug:  ibuprofen Your last dose was: Your next dose is: Take  by mouth every six (6) hours as needed. Refills:  0 PriLOSEC 20 mg capsule Generic drug:  omeprazole Your last dose was: Your next dose is:    
   
   
 Dose:  20 mg Take 20 mg by mouth daily. Refills:  0 PROBIOTIC 4X 10-15 mg Tbec Generic drug:  B.infantis-B.ani-B.long-B.bifi Your last dose was: Your next dose is: Take  by mouth. Refills:  0  
     
   
   
   
  
 PROSTATE THERAPY PO Your last dose was: Your next dose is: Take  by mouth. Refills:  0  
     
   
   
   
  
 temazepam 30 mg capsule Commonly known as:  RESTORIL Your last dose was: Your next dose is: Take  by mouth nightly as needed for Sleep. Refills:  0 Where to Get Your Medications Information on where to get these meds will be given to you by the nurse or doctor. ! Ask your nurse or doctor about these medications HYDROcodone-acetaminophen 5-325 mg per tablet  
 tiZANidine 2 mg tablet Discharge Instructions DISCHARGE SUMMARY from Nurse The following personal items are in your possession at time of discharge: 
 
Dental Appliances: None Visual Aid: Glasses PATIENT INSTRUCTIONS: 
 
 
F-face looks uneven A-arms unable to move or move unevenly S-speech slurred or non-existent T-time-call 911 as soon as signs and symptoms begin-DO NOT go Back to bed or wait to see if you get better-TIME IS BRAIN. Warning Signs of HEART ATTACK Call 911 if you have these symptoms: 
? Chest discomfort. Most heart attacks involve discomfort in the center of the chest that lasts more than a few minutes, or that goes away and comes back. It can feel like uncomfortable pressure, squeezing, fullness, or pain. ? Discomfort in other areas of the upper body. Symptoms can include pain or discomfort in one or both arms, the back, neck, jaw, or stomach. ? Shortness of breath with or without chest discomfort. ? Other signs may include breaking out in a cold sweat, nausea, or lightheadedness. Don't wait more than five minutes to call [address]! Fast action can save your life. Calling 911 is almost always the fastest way to get lifesaving treatment. Emergency Medical Services staff can begin treatment when they arrive  up to an hour sooner than if someone gets to the hospital by car. The discharge information has been reviewed with the patient. The patient verbalized understanding. Discharge medications reviewed with the patient and appropriate educational materials and side effects teaching were provided. Patient armband removed and shredded. Discharge Orders Procedure Order Date Status Priority Quantity Spec Type Associated Dx 200 [facility] 05/31/17 1122 Normal Routine 1 Comments:  PT for mobility and safety: Bedside commode Introducing [facility]! Latoya Lea introduces [email or web address] patient portal. Now you can access parts of your medical record, email your doctor's office, and request medication refills online. 1. In your internet browser, go to [email or web address] 2. Click on the First Time User? Click Here link in the Sign In box. You will see the New Member Sign Up page. 3. Enter your [employer] Access Code exactly as it appears below. You will not need to use this code after youve completed the sign-up process. If you do not sign up before the expiration date, you must request a new code. · [employer] Access Code: [email or web address] Expires: 8/28/2017  8:26 AM 
 
4. Enter the last four digits of your Social Security Number (xxxx) and Date of Birth (mm/dd/yyyy) as indicated and click Submit. You will be taken to the next sign-up page. 5. Create a [employer] ID. This will be your [employer] login ID and cannot be changed, so think of one that is secure and easy to remember. 6. Create a [employer] password. You can change your password at any time. 7. Enter your Password Reset Question and Answer. This can be used at a later time if you forget your password. 8. Enter your e-mail address. You will receive e-mail notification when new information is available in [address] 9. Click Sign Up. You can now view and download portions of your medical record. 10. Click the Download Summary menu link to download a portable copy of your medical information. If you have questions, please visit the Frequently Asked Questions section of the [employer] website. Remember, [employer] is NOT to be used for urgent needs. For medical emergencies, dial 911. Now available from your iPhone and Android! General Information Please provide this summary of care documentation to your next provider. Patient Signature:  ____________________________________________________________ Date:  ____________________________________________________________  
  
Lilian Sinha Provider Signature:  ____________________________________________________________ Date:  ____________________________________________________________

## 2017-05-30 NOTE — BRIEF OP NOTE
BRIEF OPERATIVE NOTE    Date of Procedure: 5/30/2017   Preoperative Diagnosis: left l4-5 herniated nucleus pulposus  m51.26  Postoperative Diagnosis: left l4-5 herniated nucleus pulposus  m51.26    Procedure(s):  left l4-5  discectomy  Surgeon(s) and Role:     * Licha Lyles MD - Primary         Assistant Staff:       Surgical Staff:  Circ-1: Alisson Smith RN  Circ-2: John Forte  Circ-Relief: Maksim Mederos  Radiology Technician: Gabby Liao  Scrub Tech-1: Laura Claudia  Surg Asst-1: Lizette Screws  Event Time In   Incision Start 1424   Incision Close      Anesthesia: General   Estimated Blood Loss: min  Specimens: * No specimens in log *   Findings: as expected   Complications: none  Implants: * No implants in log *

## 2017-05-31 VITALS
TEMPERATURE: 98.4 F | DIASTOLIC BLOOD PRESSURE: 71 MMHG | WEIGHT: 189.31 LBS | OXYGEN SATURATION: 94 % | BODY MASS INDEX: 27.1 KG/M2 | RESPIRATION RATE: 18 BRPM | HEART RATE: 109 BPM | SYSTOLIC BLOOD PRESSURE: 114 MMHG | HEIGHT: 70 IN

## 2017-05-31 PROCEDURE — 99218 HC RM OBSERVATION: CPT

## 2017-05-31 PROCEDURE — 74011000258 HC RX REV CODE- 258: Performed by: NEUROLOGICAL SURGERY

## 2017-05-31 PROCEDURE — 77010033678 HC OXYGEN DAILY

## 2017-05-31 PROCEDURE — 74011250637 HC RX REV CODE- 250/637: Performed by: NEUROLOGICAL SURGERY

## 2017-05-31 PROCEDURE — 65390000012 HC CONDITION CODE 44 OBSERVATION

## 2017-05-31 PROCEDURE — 74011250636 HC RX REV CODE- 250/636: Performed by: NEUROLOGICAL SURGERY

## 2017-05-31 RX ORDER — HYDROCODONE BITARTRATE AND ACETAMINOPHEN 5; 325 MG/1; MG/1
1 TABLET ORAL
Qty: 28 TAB | Refills: 0 | Status: SHIPPED | OUTPATIENT
Start: 2017-05-31 | End: 2018-02-19

## 2017-05-31 RX ORDER — TIZANIDINE 2 MG/1
4 TABLET ORAL
Qty: 20 TAB | Refills: 0 | Status: SHIPPED | OUTPATIENT
Start: 2017-05-31 | End: 2018-02-19

## 2017-05-31 RX ADMIN — GLIMEPIRIDE 1 MG: 2 TABLET ORAL at 09:05

## 2017-05-31 RX ADMIN — HYDROCODONE BITARTRATE AND ACETAMINOPHEN 1 TABLET: 5; 325 TABLET ORAL at 09:53

## 2017-05-31 RX ADMIN — HYDROMORPHONE HYDROCHLORIDE 1 MG: 1 INJECTION, SOLUTION INTRAMUSCULAR; INTRAVENOUS; SUBCUTANEOUS at 05:59

## 2017-05-31 RX ADMIN — HYDROCODONE BITARTRATE AND ACETAMINOPHEN 1 TABLET: 5; 325 TABLET ORAL at 04:13

## 2017-05-31 RX ADMIN — CEFAZOLIN SODIUM 1 G: 1 INJECTION, POWDER, FOR SOLUTION INTRAMUSCULAR; INTRAVENOUS at 05:58

## 2017-05-31 RX ADMIN — METFORMIN HYDROCHLORIDE 1000 MG: 500 TABLET, FILM COATED ORAL at 09:04

## 2017-05-31 RX ADMIN — PANTOPRAZOLE SODIUM 40 MG: 40 TABLET, DELAYED RELEASE ORAL at 09:04

## 2017-05-31 RX ADMIN — TELMISARTAN 20 MG: 40 TABLET ORAL at 09:04

## 2017-05-31 RX ADMIN — LEVOTHYROXINE SODIUM 175 MCG: 75 TABLET ORAL at 09:05

## 2017-05-31 NOTE — PROGRESS NOTES
Patient has designated his wife to participate in his/her discharge plan and to receive any needed information.      Name:   Ruth Oreilly  spouse   [phone] [id number] , [place], [address]   May 31, 2017     Address:  Phone number:

## 2017-05-31 NOTE — PROGRESS NOTES
Mushtaq   Discharge Planning/ Assessment    Reasons for Intervention: Interviewed patient,  He agrees to share his discharge information with his wife, see below. He was independent prior to admission and see Dr River Falk for his primary care needs. His discharge plan is to return home with home care and is requesting a BSC.      High Risk Criteria  [x] Yes  []No   Physician Referral  [] Yes  [x]No        Date    Nursing Referral  [] Yes  [x]No        Date    Patient/Family Request  [] Yes  [x]No        Date       Resources:    Medicare  [x] Yes  []No   Medicaid  [] Yes  [x]No   No Resources  [] Yes  [x]No   Private Insurance  [x] Yes  []No    Name/Phone Number    Other  [] Yes  [x]No        (i.e. Workman's Comp)         Prior Services:    Prior Services  [] Yes  [x]No   Home Health  [] Yes  [x]No   [address]  [] Yes  [x]No        Number of [address]  [] Yes  [x]No       Meals on Wheels  [] Yes  [x]No   Office on Aging  [] Yes  [x]No   Transportation Services  [] Yes  [x]No   Nursing Home  [] Yes  [x]No        Nursing Home Name    [address]  [] Yes  [x]No        [address] Name    Other       Information Source:      Information obtained from  [x] Patient  [] Parent   [] [address]  [] Child  [] Spouse   [] Significant Other/Partner   [] Friend      [] EMS    [] Nursing Home Chart          [x] Other:chart   Chart Review  [x] Yes  []No     Family/Support System:    Patient lives with  [] Alone    [x] Spouse   [] Significant Other  [] Children  [] Caretaker   [] Parent  [] Sibling     [] Other       Other Support System:    Is the patient responsible for care of others  [] Yes  [x]No   Information of person caring for patient on  discharge self   Managers financial affairs independently  [x] Yes  []No   If no, explain:      Status Prior to Admission:    Mental Status  [x] Awake  [x] Alert  [x] Oriented  [] Quiet/Calm [] Lethargic/Sedated   [] Disoriented  [] Restless/Anxious  [] Combative   Personal Care  [] Dependent  [x] Independent Personal Care  [] Requires Assistance   Meal Preparation Ability  [x] Independent   [] Standby Assistance   [] Minimal Assistance   [] Moderate Assistance  [] Maximum Assistance     [] Total Assistance   Chores  [x] Independent with Chores   [] N/A Nursing Home Resident   [] Requires Assistance   Bowel/Bladder  [x] Continent  [] Catheter  [] Incontinent  [] Ostomy Self-Care    [] Urine Diversion Self-Care  [] Maximum Assistance     [] Total Assistance   Number of Persons needed for assistance    DME at home  [] Chase Kenney  [] Yfn Kenney   [] Commode    [] Bathroom/Grab Bars  [] Hospital Bed  [] Nebulizer  [] Oxygen           [] Raised Toilet Seat  [] Shower Chair  [] Side Rails for Bed   [] Tub Transfer Bench   [x] Evone Leaver  [] Kayley Severe, Standard      [] Other:   Vendor      Treatment Presently Receiving:    Current Treatments  [] Chemotherapy  [] Dialysis  [] Insulin  [] IVAB [x] IVF   [] O2  [] PCA   [x] PT   [] RT   [] Tube Feedings   [] Wound Care     Psychosocial Evaluation:    Verbalized Knowledge of Disease Process  [x] Patient  [x]Family   Coping with Disease Process  [x] Patient  [x]Family   Requires Further Counseling Coping with Disease Process  [] Patient  []Family     Identified Projected Needs:    Home Health Aid  [] Yes  [x]No   Transportation  [] Yes  [x]No   Education  [] Yes  [x]No        Specific Education     Financial Counseling  [] Yes  [x]No   Inability to Care for Self/Will Require 24 hour care  [] Yes  [x]No   Pain Management  [] Yes  [x]No   Home Infusion Therapy  [] Yes  [x]No   Oxygen Therapy  [] Yes  [x]No   DME  [] Yes  [x]No   Long Term Care Placement  [] Yes  [x]No   Rehab  [] Yes  [x]No   Physical Therapy  [x] Yes  []No   Needs Anticipated At This Time  [x] Yes  []No     Intra-Hospital Referral:    [facility]  [x] Yes  []No     [] Yes  [x]No Patient Representative  [] Yes  [x]No   Staff for Teaching Needs  [] Yes  [x]No   Specialty Teaching Needs     Diabetic Educator  [] Yes  [x]No   Referral for Diabetic Educator Needed  [] Yes  [x]No  If Yes, place order for Nutritionist or Diabetic Consult     Tentative Discharge Plan:    Home with No Services  [] Yes  [x]No   Home with Home Health Follow-up  [x] Yes  []No        If Yes, specify type Nursing/therapy   [address]  [] Yes  [x]No        If Yes, specify type    Meals on Wheels  [] Yes  [x]No   Office of Aging  [] Yes  [x]No   NHP  [] Yes  [x]No   Return to the Nursing Home  [] Yes  [x]No   Rehab Therapy  [] Yes  [x]No   Acute Rehab  [] Yes  [x]No   Subacute Rehab  [] Yes  [x]No   Private Care  [] Yes  [x]No   Substance Abuse Referral  [] Yes  [x]No   Transportation  [] Yes  [x]No   Chore Service  [] Yes  [x]No   Inpatient Hospice  [] Yes  [x]No   OP RT  [] Yes  [x] No   OP Hemo  [] Yes  [x] No   OP PT  [] Yes  [x]No   Support Group  [] Yes  [x]No   Reach to Recovery  [] Yes  [x]No   OP Oncology Clinic  [] Yes  [x]No   Clinic Appointment  [] Yes  [x]No   DME  [] Yes  [x]No   Comments    Name of D/C Planner or  Given to Patient or Family Alina Thompson RN   Phone Number [phone]   Date May 31, 2017   Time 748 am   If you are discharged home, whom do you designate to participate in your discharge plan and receive any information needed?      Enter name of Georgie ShahEdmodo  spouse        Phone # of designee         Address of [place], [id number]        Updated May 31, 2017        Patient refused to designate any           individual

## 2017-05-31 NOTE — PROGRESS NOTES
Care Management Interventions  Palliative Care Consult (Criteria: CHF and RRAT>21): No  Reason for No Palliative Care Consult: Patient declined palliative services at this time  Mode of Transport at Discharge:  Other (see comment)  Transition of Care Consult (CM Consult): [address]: No  Reason Outside [place]: Patient already serviced by other home care/hospice agency ([address])  [address]: No  Discharge Durable Medical Equipment: Yes (Bedside commode)  Physical Therapy Consult: No  Occupational Therapy Consult: No  Speech Therapy Consult: No  Current Support Network: Lives with Spouse  Confirm Follow Up Transport: Family  Plan discussed with Pt/Family/Caregiver: Yes  Freedom of Choice Offered: Yes  Discharge Location  Discharge Placement: Home with home health

## 2017-05-31 NOTE — DISCHARGE INSTRUCTIONS
DISCHARGE SUMMARY from Nurse    The following personal items are in your possession at time of discharge:    Dental Appliances: None  Visual Aid: Glasses                            PATIENT INSTRUCTIONS:    After general anesthesia or intravenous sedation, for 24 hours or while taking prescription Narcotics:  · Limit your activities  · Do not drive and operate hazardous machinery  · Do not make important personal or business decisions  · Do  not drink alcoholic beverages  · If you have not urinated within 8 hours after discharge, please contact your surgeon on call. Report the following to your surgeon:  · Excessive pain, swelling, redness or odor of or around the surgical area  · Temperature over 100.5  · Nausea and vomiting lasting longer than 4 hours or if unable to take medications  · Any signs of decreased circulation or nerve impairment to extremity: change in color, persistent  numbness, tingling, coldness or increase pain  · Any questions        What to do at Home:  Recommended activity: Activity as tolerated, no bending, lifting or twisting, and  no driving for today. If you experience any of the following symptoms increased pain, numbness or tingling in arms and legs, please follow up with Dr. Natalie Strong. *  Please give a list of your current medications to your Primary Care Provider. *  Please update this list whenever your medications are discontinued, doses are      changed, or new medications (including over-the-counter products) are added. *  Please carry medication information at all times in case of emergency situations. These are general instructions for a healthy lifestyle:    No smoking/ No tobacco products/ Avoid exposure to second hand smoke    Surgeon General's Warning:  Quitting smoking now greatly reduces serious risk to your health.     Obesity, smoking, and sedentary lifestyle greatly increases your risk for illness    A healthy diet, regular physical exercise & weight monitoring are important for maintaining a healthy lifestyle    You may be retaining fluid if you have a history of heart failure or if you experience any of the following symptoms:  Weight gain of 3 pounds or more overnight or 5 pounds in a week, increased swelling in our hands or feet or shortness of breath while lying flat in bed. Please call your doctor as soon as you notice any of these symptoms; do not wait until your next office visit. Recognize signs and symptoms of STROKE:    F-face looks uneven    A-arms unable to move or move unevenly    S-speech slurred or non-existent    T-time-call 911 as soon as signs and symptoms begin-DO NOT go       Back to bed or wait to see if you get better-TIME IS BRAIN. Warning Signs of HEART ATTACK     Call 911 if you have these symptoms:   Chest discomfort. Most heart attacks involve discomfort in the center of the chest that lasts more than a few minutes, or that goes away and comes back. It can feel like uncomfortable pressure, squeezing, fullness, or pain.  Discomfort in other areas of the upper body. Symptoms can include pain or discomfort in one or both arms, the back, neck, jaw, or stomach.  Shortness of breath with or without chest discomfort.  Other signs may include breaking out in a cold sweat, nausea, or lightheadedness. Don't wait more than five minutes to call 911 - MINUTES MATTER! Fast action can save your life. Calling 911 is almost always the fastest way to get lifesaving treatment. Emergency Medical Services staff can begin treatment when they arrive -- up to an hour sooner than if someone gets to the hospital by car. The discharge information has been reviewed with the patient. The patient verbalized understanding. Discharge medications reviewed with the patient and appropriate educational materials and side effects teaching were provided. Patient armband removed and shredded.

## 2017-05-31 NOTE — PROGRESS NOTES
conducted an Initial consultation and Spiritual Assessment for Kamaljit Hugo, who is a 67 y. o.,male. Patients Primary Language is: [place]. According to the patients EMR Mormonism Affiliation is: Holiness. The reason the Patient came to the hospital is:   Patient Active Problem List    Diagnosis Date Noted    Scoliosis deformity of spine 05/30/2017    Lumbar disc herniation with radiculopathy 05/30/2017    Elevated PSA 03/13/2012    Urinary incontinence 03/12/2012        The  provided the following Interventions:  Initiated a relationship of care and support. Explored issues of lance, belief, spirituality and Nondenominational needs while hospitalized. Patient confirmed his Nondenominational affiliation. Listened empathically to patient's Nondenominational and spiritual journey. Provided chaplaincy education and information about Spiritual Care Services. Offered assurance of continued prayer on patient's behalf. Prayer card provided. Chart reviewed. The following outcomes were achieved:  Patient shared limited information about his medical narrative. Patient processed feeling about current hospitalization. He said that he may be discharged today. Patient expressed gratitude for the 's visit. Assessment:  Patient did not indicate any other spiritual or Nondenominational issues which require Spiritual Care Services interventions at this time. Plan:  Chaplains will continue to follow and will provide pastoral care as needed or requested. The Rev.  [address] [facility], [facility] [address] [facility] 159  [facility] [phone] / [facility] [phone]

## 2017-05-31 NOTE — PROGRESS NOTES
Patient and/or next of kin has been given and has signed the [facility] Outpatient Observation  Notification letter and all questions answered. Copy of this notice given to patient and copy placed on chart. Patient and/or next of kin has been given the Outpatient Observation Information and Notification letter and all questions answered.

## 2017-05-31 NOTE — PROGRESS NOTES
Certified Rivas Reynaga provider rounded on Wen Campoverde to provide education related to sleep apnea after chart review for risk factors. Risk factors include:  1. Hypertension  2. Diabetes, Type II  3. GERD  4. Mallampati II  5. STOP BANG score 7  6. Oaklyn Sleepiness score 1    Provided patient with the following pamphlets:  1. What is Sleep Apnea  2. Sleep and Medical problems  3. Sleep & Your Heart  4. Common Sleep Problems for Older Adults  5. Tips For Sleep As You Age  10. Tips For Better Sleep In Older Adults    Patient Education:  1. Reviewed sleep hygiene & effects of poor sleep quality. 2. Reviewed relationship between sleep & heart health. 3. Reviewed relationship between sleep & age. Patient stated that he was scheduled to have a sleep study at another facility prior to being admitted. He has awakened himself gasping for air. Patient also wears a mouthpiece to help with grinding his teeth. Recommendations:  1. Referral to sleep specialist for evaluation if symptoms of poor sleep quality present. 2. Order baseline PSG testing to be arranged as an outpatient. 3. Follow up with sleep specialist for treatment and management.

## 2017-05-31 NOTE — PROGRESS NOTES
Patient has been changed from inpatient to observation and a Condition 44 has been completed based on Medicare criteria. The patient / or next of kin will be made aware of this change in status and given a copy of  the [facility] Outpatient Observation Information and Notification letter.

## 2017-05-31 NOTE — PROGRESS NOTES
Offered the pt FOC for home care, he chose [employer]. Pt verified the contact information on the face sheet is correct. Referral sent to them via [facility]. Pt reported he has a bedside commode in his attic. He will ask his son to find it and bring it to him. He reported he \"thought\" it had been maybe 10 years since he received it through his insurance. Informed him the bedside commode will be ordered for him but to expect he does need meet Medicare criteria for a bedside commode. He will self purchase a bedside commode in the community it the one ordered from [facility] is not delivered in 1-2 days.

## 2017-05-31 NOTE — PROGRESS NOTES
Pt sitting on side of bed. Complains of some pain but states pain meds help. Pt states he wants to go home today. States he has been using IS, but \"not every hour. I do it when I think about it and that's not very often\". Pt encouraged to use every hour to reduce risk of pneumonia. Verbalized understanding. Pt denies needs at this time.

## 2017-05-31 NOTE — PROGRESS NOTES
1945: Received patient in bed awake,alert and oriented x4. No signs of distress. Denies any pain at this time. Call bell within reach. Will continue monitoring. 0002: Ambulate in the hallway  with walker and  nursing assistant,tolerated well. 0007: Back in bed without any problems,medicated for pain ( see MAR) ,resting now. Call bell within reach. Will continue monitoring.

## 2017-06-01 NOTE — PROGRESS NOTES
0730 - received pt room. Pt rated 5/10 to back. AO. Resting in bed    0905 - assessment completed. Pt is AOx4. VSS. Pt up to bedside. IV fluid infusing. Call bell at bedside. No distress noted. 1236 - patient resting in bed, no distress noted.      1258 - pt d/c'd from floor, no distress noted

## 2017-07-14 ENCOUNTER — TELEPHONE (OUTPATIENT)
Dept: SLEEP MEDICINE | Age: 72
End: 2017-07-14

## 2017-07-14 NOTE — TELEPHONE ENCOUNTER
Patient stated that he has not been for a sleep study yet due to rehab from back surgery. Patient was scheduled to have a sleep study done prior to being admitted for surgery. Patient is scheduled to meet with the surgeon on 8/2/17 to evaluate progress.

## 2018-02-19 ENCOUNTER — HOSPITAL ENCOUNTER (OUTPATIENT)
Dept: PREADMISSION TESTING | Age: 73
Discharge: HOME | DRG: 460 | End: 2018-02-19
Payer: MEDICARE

## 2018-02-19 ENCOUNTER — HOSPITAL ENCOUNTER (OUTPATIENT)
Dept: LAB | Age: 73
Discharge: HOME | DRG: 460 | End: 2018-02-19
Payer: MEDICARE

## 2018-02-19 ENCOUNTER — HOSPITAL ENCOUNTER (OUTPATIENT)
Dept: OTHER | Age: 73
Discharge: HOME | DRG: 460 | End: 2018-02-19
Payer: MEDICARE

## 2018-02-19 ENCOUNTER — ANESTHESIA EVENT (OUTPATIENT)
Dept: SURGERY | Age: 73
DRG: 460 | End: 2018-02-19
Payer: MEDICARE

## 2018-02-19 DIAGNOSIS — M96.3 KYPHOSIS, POSTLAMINECTOMY: ICD-10-CM

## 2018-02-19 DIAGNOSIS — E11.9 TYPE II DIABETES MELLITUS (HCC): ICD-10-CM

## 2018-02-19 DIAGNOSIS — Z01.818 PRE-OP TESTING: ICD-10-CM

## 2018-02-19 LAB
ABO + RH BLD: NORMAL
APTT PPP: 27.5 SEC (ref 23–36.4)
BASOPHILS # BLD: 0 K/UL (ref 0–0.06)
BASOPHILS NFR BLD: 1 % (ref 0–2)
BLOOD GROUP ANTIBODIES SERPL: NORMAL
DIFFERENTIAL METHOD BLD: ABNORMAL
EOSINOPHIL # BLD: 0.2 K/UL (ref 0–0.4)
EOSINOPHIL NFR BLD: 2 % (ref 0–5)
ERYTHROCYTE [DISTWIDTH] IN BLOOD BY AUTOMATED COUNT: 12.9 % (ref 11.6–14.5)
HCT VFR BLD AUTO: 42.3 % (ref 36–48)
HGB BLD-MCNC: 14.4 G/DL (ref 13–16)
INR PPP: 1 (ref 0.8–1.2)
LYMPHOCYTES # BLD: 1.8 K/UL (ref 0.9–3.6)
LYMPHOCYTES NFR BLD: 22 % (ref 21–52)
MCH RBC QN AUTO: 31.9 PG (ref 24–34)
MCHC RBC AUTO-ENTMCNC: 34 G/DL (ref 31–37)
MCV RBC AUTO: 93.8 FL (ref 74–97)
MONOCYTES # BLD: 0.5 K/UL (ref 0.05–1.2)
MONOCYTES NFR BLD: 6 % (ref 3–10)
NEUTS SEG # BLD: 5.4 K/UL (ref 1.8–8)
NEUTS SEG NFR BLD: 69 % (ref 40–73)
PLATELET # BLD AUTO: 176 K/UL (ref 135–420)
PMV BLD AUTO: 11.8 FL (ref 9.2–11.8)
PROTHROMBIN TIME: 12.5 SEC (ref 11.5–15.2)
RBC # BLD AUTO: 4.51 M/UL (ref 4.7–5.5)
SPECIMEN EXP DATE BLD: NORMAL
WBC # BLD AUTO: 7.9 K/UL (ref 4.6–13.2)

## 2018-02-19 PROCEDURE — 85025 COMPLETE CBC W/AUTO DIFF WBC: CPT | Performed by: NEUROLOGICAL SURGERY

## 2018-02-19 PROCEDURE — 85610 PROTHROMBIN TIME: CPT | Performed by: NEUROLOGICAL SURGERY

## 2018-02-19 PROCEDURE — 85730 THROMBOPLASTIN TIME PARTIAL: CPT | Performed by: NEUROLOGICAL SURGERY

## 2018-02-19 PROCEDURE — 36415 COLL VENOUS BLD VENIPUNCTURE: CPT | Performed by: NEUROLOGICAL SURGERY

## 2018-02-19 PROCEDURE — 86900 BLOOD TYPING SEROLOGIC ABO: CPT | Performed by: NEUROLOGICAL SURGERY

## 2018-02-19 PROCEDURE — 93005 ELECTROCARDIOGRAM TRACING: CPT

## 2018-02-19 PROCEDURE — 71046 X-RAY EXAM CHEST 2 VIEWS: CPT

## 2018-02-19 RX ORDER — TELMISARTAN 20 MG/1
20 TABLET ORAL DAILY
COMMUNITY

## 2018-02-19 RX ORDER — CARVEDILOL 3.12 MG/1
3.12 TABLET ORAL 2 TIMES DAILY WITH MEALS
COMMUNITY

## 2018-02-19 RX ORDER — LEVOTHYROXINE SODIUM 125 UG/1
125 TABLET ORAL
COMMUNITY

## 2018-02-19 RX ORDER — AMITRIPTYLINE HYDROCHLORIDE 10 MG/1
10 TABLET, FILM COATED ORAL
COMMUNITY
End: 2018-07-16

## 2018-02-19 NOTE — PERIOP NOTES
PAT - SURGICAL PRE-ADMISSION INSTRUCTIONS    NAME:  Mitul Arroyo                                                          TODAY'S DATE:  2/19/2018    SURGERY DATE:  2/20/2018                                  SURGERY ARRIVAL TIME:   0900    1. Do NOT eat or drink anything, including candy or gum, after MIDNIGHT on 02/19/18 , unless you have specific instructions from your Surgeon or Anesthesia Provider to do so. 2. No smoking on the day of surgery. 3. No alcohol 24 hours prior to the day of surgery. 4. No recreational drugs for one week prior to the day of surgery. 5. Leave all valuables, including money/purse, at home. 6. Remove all jewelry, nail polish, makeup (including mascara); no lotions, powders, deodorant, or perfume/cologne/after shave. 7. Glasses/Contact lenses and Dentures may be worn to the hospital.  They will be removed prior to surgery. 8. Call your doctor if symptoms of a cold or illness develop within 24 ours prior to surgery. 9. AN ADULT MUST DRIVE YOU HOME AFTER OUTPATIENT SURGERY. 10. If you are having an OUTPATIENT procedure, please make arrangements for a responsible adult to be with you for 24 hours after your surgery. 11. If you are admitted to the hospital, you will be assigned to a bed after surgery is complete. Normally a family member will not be able to see you until you are in your assigned bed. 15. Family is encouraged to accompany you to the hospital.  We ask visitors in the treatment area to be limited to ONE person at a time to ensure patient privacy. EXCEPTIONS WILL BE MADE AS NEEDED. 15. Children under 12 are discouraged from entering the treatment area and need to be supervised by an adult when in the waiting room. Special Instructions:    Hold evening dose of Metformin. Take Carvedilol , Synthroid and Prilosec in AM with sip of water    Patient Prep:    use CHG solution    These surgical instructions were reviewed with Roderick Schrader during the PAT visit.    A printed copy of the instructions was provided to Micky. Directions: On the morning of surgery, please go to the [facility]. Enter the building from the [facility] entrance, 1st floor (next to the Emergency Room entrance). Take the elevator to the 2nd floor. Sign in at the Registration Desk.     If you have any questions and/or concerns, please do not hesitate to call:  (Prior to the day of surgery)  [facility] unit:  [phone]  (Day of surgery)  [facility] unit:  [phone]

## 2018-02-20 ENCOUNTER — APPOINTMENT (OUTPATIENT)
Dept: GENERAL RADIOLOGY | Age: 73
DRG: 460 | End: 2018-02-20
Attending: NEUROLOGICAL SURGERY
Payer: MEDICARE

## 2018-02-20 ENCOUNTER — HOSPITAL ENCOUNTER (INPATIENT)
Age: 73
LOS: 6 days | Discharge: HOME | DRG: 460 | End: 2018-02-26
Attending: NEUROLOGICAL SURGERY | Admitting: NEUROLOGICAL SURGERY
Payer: MEDICARE

## 2018-02-20 ENCOUNTER — ANESTHESIA (OUTPATIENT)
Dept: SURGERY | Age: 73
DRG: 460 | End: 2018-02-20
Payer: MEDICARE

## 2018-02-20 DIAGNOSIS — M51.16 LUMBAR DISC HERNIATION WITH RADICULOPATHY: Primary | ICD-10-CM

## 2018-02-20 PROBLEM — G47.30 SLEEP APNEA: Status: ACTIVE | Noted: 2018-02-20

## 2018-02-20 PROBLEM — E11.9 DM TYPE 2 (DIABETES MELLITUS, TYPE 2) (HCC): Status: ACTIVE | Noted: 2018-02-20

## 2018-02-20 PROBLEM — E78.5 HYPERLIPIDEMIA: Status: ACTIVE | Noted: 2018-02-20

## 2018-02-20 PROBLEM — I10 HYPERTENSION: Status: ACTIVE | Noted: 2018-02-20

## 2018-02-20 PROBLEM — K21.9 GERD (GASTROESOPHAGEAL REFLUX DISEASE): Status: ACTIVE | Noted: 2018-02-20

## 2018-02-20 PROBLEM — E07.9 THYROID DISEASE: Status: ACTIVE | Noted: 2018-02-20

## 2018-02-20 LAB
ATRIAL RATE: 76 BPM
CALCULATED P AXIS, ECG09: 55 DEGREES
CALCULATED R AXIS, ECG10: 0 DEGREES
CALCULATED T AXIS, ECG11: 33 DEGREES
DIAGNOSIS, 93000: NORMAL
GLUCOSE BLD STRIP.AUTO-MCNC: 118 MG/DL (ref 70–110)
GLUCOSE BLD STRIP.AUTO-MCNC: 128 MG/DL (ref 70–110)
GLUCOSE BLD STRIP.AUTO-MCNC: 194 MG/DL (ref 70–110)
P-R INTERVAL, ECG05: 174 MS
Q-T INTERVAL, ECG07: 370 MS
QRS DURATION, ECG06: 70 MS
QTC CALCULATION (BEZET), ECG08: 416 MS
VENTRICULAR RATE, ECG03: 76 BPM

## 2018-02-20 PROCEDURE — 74011250636 HC RX REV CODE- 250/636: Performed by: ANESTHESIOLOGY

## 2018-02-20 PROCEDURE — 77030032490 HC SLV COMPR SCD KNE COVD -B: Performed by: NEUROLOGICAL SURGERY

## 2018-02-20 PROCEDURE — 74011250636 HC RX REV CODE- 250/636: Performed by: NEUROLOGICAL SURGERY

## 2018-02-20 PROCEDURE — 74011250636 HC RX REV CODE- 250/636

## 2018-02-20 PROCEDURE — 77030018846 HC SOL IRR STRL H20 ICUM -A: Performed by: NEUROLOGICAL SURGERY

## 2018-02-20 PROCEDURE — 77030034694 HC SCPL CANADY PLSM DISP USMD -E: Performed by: NEUROLOGICAL SURGERY

## 2018-02-20 PROCEDURE — 77030018719 HC DRSG PTCH ANTIMIC J&J -A: Performed by: NEUROLOGICAL SURGERY

## 2018-02-20 PROCEDURE — 77030034169 HC GRFT BN BIOACTV INTRFC 1G BSTEB -F: Performed by: NEUROLOGICAL SURGERY

## 2018-02-20 PROCEDURE — 77030011640 HC PAD GRND REM COVD -A: Performed by: NEUROLOGICAL SURGERY

## 2018-02-20 PROCEDURE — C1713 ANCHOR/SCREW BN/BN,TIS/BN: HCPCS | Performed by: NEUROLOGICAL SURGERY

## 2018-02-20 PROCEDURE — 77030019908 HC STETH ESOPH SIMS -A: Performed by: NURSE ANESTHETIST, CERTIFIED REGISTERED

## 2018-02-20 PROCEDURE — 74011250637 HC RX REV CODE- 250/637: Performed by: PHYSICIAN ASSISTANT

## 2018-02-20 PROCEDURE — 0SG00AJ FUSION OF LUMBAR VERTEBRAL JOINT WITH INTERBODY FUSION DEVICE, POSTERIOR APPROACH, ANTERIOR COLUMN, OPEN APPROACH: ICD-10-PCS | Performed by: NEUROLOGICAL SURGERY

## 2018-02-20 PROCEDURE — 74011250636 HC RX REV CODE- 250/636: Performed by: PHYSICIAN ASSISTANT

## 2018-02-20 PROCEDURE — 77030035236 HC SUT PDS STRATFX BARB J&J -B: Performed by: NEUROLOGICAL SURGERY

## 2018-02-20 PROCEDURE — 77030011265 HC ELECTRD BLD HEX COVD -A: Performed by: NEUROLOGICAL SURGERY

## 2018-02-20 PROCEDURE — 74011250637 HC RX REV CODE- 250/637: Performed by: NURSE ANESTHETIST, CERTIFIED REGISTERED

## 2018-02-20 PROCEDURE — 76060000036 HC ANESTHESIA 2.5 TO 3 HR: Performed by: NEUROLOGICAL SURGERY

## 2018-02-20 PROCEDURE — 77030014647 HC SEAL FBRN TISSL BAXT -D: Performed by: NEUROLOGICAL SURGERY

## 2018-02-20 PROCEDURE — 77030029251 HC SPCR SPN GLMB -K1: Performed by: NEUROLOGICAL SURGERY

## 2018-02-20 PROCEDURE — 77030033138 HC SUT PGA STRATFX J&J -B: Performed by: NEUROLOGICAL SURGERY

## 2018-02-20 PROCEDURE — 77030012406 HC DRN WND PENRS BARD -A: Performed by: NEUROLOGICAL SURGERY

## 2018-02-20 PROCEDURE — 74011000272 HC RX REV CODE- 272: Performed by: NEUROLOGICAL SURGERY

## 2018-02-20 PROCEDURE — 77030013079 HC BLNKT BAIR HGGR 3M -A: Performed by: NURSE ANESTHETIST, CERTIFIED REGISTERED

## 2018-02-20 PROCEDURE — 77030029372 HC ADH SKN CLSR PRINEO J&J -C: Performed by: NEUROLOGICAL SURGERY

## 2018-02-20 PROCEDURE — 74011000250 HC RX REV CODE- 250: Performed by: NEUROLOGICAL SURGERY

## 2018-02-20 PROCEDURE — 77030020486 HC ELECTRD EMG KT NUVA -G1: Performed by: NEUROLOGICAL SURGERY

## 2018-02-20 PROCEDURE — 77030027138 HC INCENT SPIROMETER -A

## 2018-02-20 PROCEDURE — 77030003028 HC SUT VCRL J&J -A: Performed by: NEUROLOGICAL SURGERY

## 2018-02-20 PROCEDURE — C1729 CATH, DRAINAGE: HCPCS | Performed by: NEUROLOGICAL SURGERY

## 2018-02-20 PROCEDURE — 77030020263 HC SOL INJ SOD CL0.9% LFCR 1000ML

## 2018-02-20 PROCEDURE — 65270000029 HC RM PRIVATE

## 2018-02-20 PROCEDURE — 77030020271 HC MISC IMPL NEURO: Performed by: NEUROLOGICAL SURGERY

## 2018-02-20 PROCEDURE — 77030029099 HC BN WAX SSPC -A: Performed by: NEUROLOGICAL SURGERY

## 2018-02-20 PROCEDURE — 07DR3ZZ EXTRACTION OF ILIAC BONE MARROW, PERCUTANEOUS APPROACH: ICD-10-PCS | Performed by: NEUROLOGICAL SURGERY

## 2018-02-20 PROCEDURE — C1762 CONN TISS, HUMAN(INC FASCIA): HCPCS | Performed by: NEUROLOGICAL SURGERY

## 2018-02-20 PROCEDURE — 77030004391 HC BUR FLUT MEDT -C: Performed by: NEUROLOGICAL SURGERY

## 2018-02-20 PROCEDURE — 76210000017 HC OR PH I REC 1.5 TO 2 HR: Performed by: NEUROLOGICAL SURGERY

## 2018-02-20 PROCEDURE — 77030003029 HC SUT VCRL J&J -B: Performed by: NEUROLOGICAL SURGERY

## 2018-02-20 PROCEDURE — 72100 X-RAY EXAM L-S SPINE 2/3 VWS: CPT

## 2018-02-20 PROCEDURE — 74011250636 HC RX REV CODE- 250/636: Performed by: NURSE ANESTHETIST, CERTIFIED REGISTERED

## 2018-02-20 PROCEDURE — 77030008683 HC TU ET CUF COVD -A: Performed by: NURSE ANESTHETIST, CERTIFIED REGISTERED

## 2018-02-20 PROCEDURE — 77030018673: Performed by: NEUROLOGICAL SURGERY

## 2018-02-20 PROCEDURE — 77030005518 HC CATH URETH FOL 2W BARD -B: Performed by: NEUROLOGICAL SURGERY

## 2018-02-20 PROCEDURE — 82962 GLUCOSE BLOOD TEST: CPT

## 2018-02-20 PROCEDURE — 77030008477 HC STYL SATN SLP COVD -A: Performed by: NURSE ANESTHETIST, CERTIFIED REGISTERED

## 2018-02-20 PROCEDURE — 74011000250 HC RX REV CODE- 250

## 2018-02-20 PROCEDURE — 77030031359 HC BLD BN MILL DISP STRY -E: Performed by: NEUROLOGICAL SURGERY

## 2018-02-20 PROCEDURE — 0SH004Z INSERTION OF INTERNAL FIXATION DEVICE INTO LUMBAR VERTEBRAL JOINT, OPEN APPROACH: ICD-10-PCS | Performed by: NEUROLOGICAL SURGERY

## 2018-02-20 PROCEDURE — 76010000172 HC OR TIME 2.5 TO 3 HR INTENSV-TIER 1: Performed by: NEUROLOGICAL SURGERY

## 2018-02-20 PROCEDURE — 77030018836 HC SOL IRR NACL ICUM -A: Performed by: NEUROLOGICAL SURGERY

## 2018-02-20 PROCEDURE — 77030034813 HC PRB BALL TIP M5 SSEP DISP NUVA -D: Performed by: NEUROLOGICAL SURGERY

## 2018-02-20 DEVICE — 5.5MM CURVED ROD, TITANIUM ALLOY, 65MM LENGTH
Type: IMPLANTABLE DEVICE | Site: SPINE LUMBAR | Status: FUNCTIONAL
Brand: CREO

## 2018-02-20 DEVICE — 5.5MM CURVED ROD, TITANIUM ALLOY, 75MM LENGTH
Type: IMPLANTABLE DEVICE | Site: SPINE LUMBAR | Status: FUNCTIONAL
Brand: CREO

## 2018-02-20 DEVICE — THREADED LOCKING CAP, CREO
Type: IMPLANTABLE DEVICE | Site: SPINE LUMBAR | Status: FUNCTIONAL
Brand: CREO

## 2018-02-20 DEVICE — CREO® THREADED 6.5 X 50MM POLYAXIAL SCREW
Type: IMPLANTABLE DEVICE | Site: SPINE LUMBAR | Status: FUNCTIONAL
Brand: CREO

## 2018-02-20 DEVICE — CREO® THREADED 6.5 X 45MM POLYAXIAL SCREW
Type: IMPLANTABLE DEVICE | Site: SPINE LUMBAR | Status: FUNCTIONAL
Brand: CREO

## 2018-02-20 DEVICE — GRAFT BNE SUB 15GM GRN CA COMPND PTTY AND SILICATE BASE INJ: Type: IMPLANTABLE DEVICE | Site: SPINE LUMBAR | Status: FUNCTIONAL

## 2018-02-20 DEVICE — INTERFACE IS A SYNTHETIC BIOACTIVE BONE GRAFT FOR USE IN THE REPAIR OF OSSEOUS DEFECTS. IT IS SUPPLIED AS IRREGULAR SYNTHETIC GRANULES OF BIOACTIVE GLASS (45S5 BIOGLASS), SIZED FROM 200 MICRONS TO 420 MICRONS. WHEN IMPLANTED IN LIVING TISSUE, THE MATERIAL UNDERGOES A TIME DEPENDENT SURFACE MODIFICATION. THE SURFACE REACTION RESULTS IN THE FORMATION OF A CALCIUM PHOSPHATE LAYER, WHICH IS EQUIVALENT IN COMPOSITION AND STRUCTURE TO THE HYDROXYAPATITE FOUND IN BONE MINERAL. THE BIOLOGICAL APATITE LAYER OF THE GRANULES PROVIDES AN OSTEOCONDUCTIVE SCAFFOLD FOR THE GENERATION OF NEW OSSEOUS TISSUE. NEW BONE INFILTRATES AROUND THE GRANULES ALLOWING THE REPAIR OF THE DEFECT AS THE GRANULES ARE ABSORBED.
Type: IMPLANTABLE DEVICE | Site: SPINE LUMBAR | Status: FUNCTIONAL
Brand: INTERFACE

## 2018-02-20 RX ORDER — EPHEDRINE SULFATE 50 MG/ML
INJECTION, SOLUTION INTRAVENOUS AS NEEDED
Status: DISCONTINUED | OUTPATIENT
Start: 2018-02-20 | End: 2018-02-20 | Stop reason: HOSPADM

## 2018-02-20 RX ORDER — HYDROMORPHONE HYDROCHLORIDE 2 MG/ML
0.5 INJECTION, SOLUTION INTRAMUSCULAR; INTRAVENOUS; SUBCUTANEOUS
Status: DISCONTINUED | OUTPATIENT
Start: 2018-02-20 | End: 2018-02-20 | Stop reason: HOSPADM

## 2018-02-20 RX ORDER — DIPHENHYDRAMINE HYDROCHLORIDE 50 MG/ML
12.5 INJECTION, SOLUTION INTRAMUSCULAR; INTRAVENOUS
Status: DISCONTINUED | OUTPATIENT
Start: 2018-02-20 | End: 2018-02-20 | Stop reason: HOSPADM

## 2018-02-20 RX ORDER — CEFAZOLIN SODIUM 1 G/3ML
INJECTION, POWDER, FOR SOLUTION INTRAMUSCULAR; INTRAVENOUS AS NEEDED
Status: DISCONTINUED | OUTPATIENT
Start: 2018-02-20 | End: 2018-02-20 | Stop reason: HOSPADM

## 2018-02-20 RX ORDER — LIDOCAINE HYDROCHLORIDE AND EPINEPHRINE 10; 10 MG/ML; UG/ML
INJECTION, SOLUTION INFILTRATION; PERINEURAL AS NEEDED
Status: DISCONTINUED | OUTPATIENT
Start: 2018-02-20 | End: 2018-02-20 | Stop reason: HOSPADM

## 2018-02-20 RX ORDER — NALOXONE HYDROCHLORIDE 0.4 MG/ML
0.4 INJECTION, SOLUTION INTRAMUSCULAR; INTRAVENOUS; SUBCUTANEOUS AS NEEDED
Status: DISCONTINUED | OUTPATIENT
Start: 2018-02-20 | End: 2018-02-26 | Stop reason: HOSPADM

## 2018-02-20 RX ORDER — SODIUM CHLORIDE, SODIUM LACTATE, POTASSIUM CHLORIDE, CALCIUM CHLORIDE 600; 310; 30; 20 MG/100ML; MG/100ML; MG/100ML; MG/100ML
100 INJECTION, SOLUTION INTRAVENOUS CONTINUOUS
Status: DISCONTINUED | OUTPATIENT
Start: 2018-02-20 | End: 2018-02-20 | Stop reason: HOSPADM

## 2018-02-20 RX ORDER — MAGNESIUM SULFATE 100 %
4 CRYSTALS MISCELLANEOUS AS NEEDED
Status: DISCONTINUED | OUTPATIENT
Start: 2018-02-20 | End: 2018-02-20 | Stop reason: HOSPADM

## 2018-02-20 RX ORDER — SODIUM CHLORIDE, SODIUM LACTATE, POTASSIUM CHLORIDE, CALCIUM CHLORIDE 600; 310; 30; 20 MG/100ML; MG/100ML; MG/100ML; MG/100ML
75 INJECTION, SOLUTION INTRAVENOUS CONTINUOUS
Status: DISCONTINUED | OUTPATIENT
Start: 2018-02-20 | End: 2018-02-21

## 2018-02-20 RX ORDER — HYDROMORPHONE HYDROCHLORIDE 1 MG/ML
1 INJECTION, SOLUTION INTRAMUSCULAR; INTRAVENOUS; SUBCUTANEOUS
Status: DISCONTINUED | OUTPATIENT
Start: 2018-02-20 | End: 2018-02-20

## 2018-02-20 RX ORDER — DIPHENHYDRAMINE HYDROCHLORIDE 50 MG/ML
12.5 INJECTION, SOLUTION INTRAMUSCULAR; INTRAVENOUS
Status: DISCONTINUED | OUTPATIENT
Start: 2018-02-20 | End: 2018-02-26 | Stop reason: HOSPADM

## 2018-02-20 RX ORDER — DIPHENHYDRAMINE HCL 25 MG
25 CAPSULE ORAL
Status: DISCONTINUED | OUTPATIENT
Start: 2018-02-20 | End: 2018-02-26 | Stop reason: HOSPADM

## 2018-02-20 RX ORDER — METFORMIN HYDROCHLORIDE 500 MG/1
1000 TABLET ORAL 2 TIMES DAILY
Status: DISCONTINUED | OUTPATIENT
Start: 2018-02-20 | End: 2018-02-21

## 2018-02-20 RX ORDER — OXYCODONE AND ACETAMINOPHEN 5; 325 MG/1; MG/1
1 TABLET ORAL AS NEEDED
Status: DISCONTINUED | OUTPATIENT
Start: 2018-02-20 | End: 2018-02-20 | Stop reason: HOSPADM

## 2018-02-20 RX ORDER — DEXAMETHASONE SODIUM PHOSPHATE 4 MG/ML
INJECTION, SOLUTION INTRA-ARTICULAR; INTRALESIONAL; INTRAMUSCULAR; INTRAVENOUS; SOFT TISSUE AS NEEDED
Status: DISCONTINUED | OUTPATIENT
Start: 2018-02-20 | End: 2018-02-20 | Stop reason: HOSPADM

## 2018-02-20 RX ORDER — PROPOFOL 10 MG/ML
INJECTION, EMULSION INTRAVENOUS AS NEEDED
Status: DISCONTINUED | OUTPATIENT
Start: 2018-02-20 | End: 2018-02-20 | Stop reason: HOSPADM

## 2018-02-20 RX ORDER — FENTANYL CITRATE 50 UG/ML
INJECTION, SOLUTION INTRAMUSCULAR; INTRAVENOUS AS NEEDED
Status: DISCONTINUED | OUTPATIENT
Start: 2018-02-20 | End: 2018-02-20 | Stop reason: HOSPADM

## 2018-02-20 RX ORDER — SODIUM CHLORIDE, SODIUM LACTATE, POTASSIUM CHLORIDE, CALCIUM CHLORIDE 600; 310; 30; 20 MG/100ML; MG/100ML; MG/100ML; MG/100ML
50 INJECTION, SOLUTION INTRAVENOUS CONTINUOUS
Status: DISCONTINUED | OUTPATIENT
Start: 2018-02-20 | End: 2018-02-20 | Stop reason: HOSPADM

## 2018-02-20 RX ORDER — ZOLPIDEM TARTRATE 5 MG/1
5 TABLET ORAL
Status: DISCONTINUED | OUTPATIENT
Start: 2018-02-20 | End: 2018-02-23

## 2018-02-20 RX ORDER — LIDOCAINE HYDROCHLORIDE AND EPINEPHRINE 10; 10 MG/ML; UG/ML
30 INJECTION, SOLUTION INFILTRATION; PERINEURAL ONCE
Status: DISCONTINUED | OUTPATIENT
Start: 2018-02-20 | End: 2018-02-20 | Stop reason: HOSPADM

## 2018-02-20 RX ORDER — ONDANSETRON 2 MG/ML
4 INJECTION INTRAMUSCULAR; INTRAVENOUS ONCE
Status: DISCONTINUED | OUTPATIENT
Start: 2018-02-20 | End: 2018-02-20 | Stop reason: HOSPADM

## 2018-02-20 RX ORDER — HYDROMORPHONE HYDROCHLORIDE 2 MG/ML
0.2 INJECTION, SOLUTION INTRAMUSCULAR; INTRAVENOUS; SUBCUTANEOUS AS NEEDED
Status: DISCONTINUED | OUTPATIENT
Start: 2018-02-20 | End: 2018-02-20 | Stop reason: HOSPADM

## 2018-02-20 RX ORDER — BUPIVACAINE HYDROCHLORIDE AND EPINEPHRINE 2.5; 5 MG/ML; UG/ML
30 INJECTION, SOLUTION EPIDURAL; INFILTRATION; INTRACAUDAL; PERINEURAL ONCE
Status: DISCONTINUED | OUTPATIENT
Start: 2018-02-20 | End: 2018-02-20 | Stop reason: CLARIF

## 2018-02-20 RX ORDER — HYDROMORPHONE HYDROCHLORIDE 2 MG/ML
1 INJECTION, SOLUTION INTRAMUSCULAR; INTRAVENOUS; SUBCUTANEOUS
Status: DISCONTINUED | OUTPATIENT
Start: 2018-02-20 | End: 2018-02-23

## 2018-02-20 RX ORDER — ONDANSETRON 2 MG/ML
4 INJECTION INTRAMUSCULAR; INTRAVENOUS
Status: DISCONTINUED | OUTPATIENT
Start: 2018-02-20 | End: 2018-02-26 | Stop reason: HOSPADM

## 2018-02-20 RX ORDER — LIDOCAINE HYDROCHLORIDE 10 MG/ML
0.1 INJECTION, SOLUTION EPIDURAL; INFILTRATION; INTRACAUDAL; PERINEURAL AS NEEDED
Status: DISCONTINUED | OUTPATIENT
Start: 2018-02-20 | End: 2018-02-20 | Stop reason: HOSPADM

## 2018-02-20 RX ORDER — DIPHENHYDRAMINE HYDROCHLORIDE 50 MG/ML
25 INJECTION, SOLUTION INTRAMUSCULAR; INTRAVENOUS
Status: DISCONTINUED | OUTPATIENT
Start: 2018-02-20 | End: 2018-02-20 | Stop reason: HOSPADM

## 2018-02-20 RX ORDER — FENTANYL CITRATE 50 UG/ML
25 INJECTION, SOLUTION INTRAMUSCULAR; INTRAVENOUS
Status: DISCONTINUED | OUTPATIENT
Start: 2018-02-20 | End: 2018-02-20 | Stop reason: HOSPADM

## 2018-02-20 RX ORDER — AMITRIPTYLINE HYDROCHLORIDE 10 MG/1
10 TABLET, FILM COATED ORAL
Status: DISCONTINUED | OUTPATIENT
Start: 2018-02-20 | End: 2018-02-26 | Stop reason: HOSPADM

## 2018-02-20 RX ORDER — TELMISARTAN 40 MG/1
20 TABLET ORAL DAILY
Status: DISCONTINUED | OUTPATIENT
Start: 2018-02-21 | End: 2018-02-26 | Stop reason: HOSPADM

## 2018-02-20 RX ORDER — BUPIVACAINE HYDROCHLORIDE AND EPINEPHRINE 2.5; 5 MG/ML; UG/ML
INJECTION, SOLUTION EPIDURAL; INFILTRATION; INTRACAUDAL; PERINEURAL AS NEEDED
Status: DISCONTINUED | OUTPATIENT
Start: 2018-02-20 | End: 2018-02-20 | Stop reason: HOSPADM

## 2018-02-20 RX ORDER — DEXTROSE MONOHYDRATE 25 G/50ML
25-50 INJECTION, SOLUTION INTRAVENOUS AS NEEDED
Status: DISCONTINUED | OUTPATIENT
Start: 2018-02-20 | End: 2018-02-20 | Stop reason: HOSPADM

## 2018-02-20 RX ORDER — ACETAMINOPHEN 325 MG/1
650 TABLET ORAL
Status: DISCONTINUED | OUTPATIENT
Start: 2018-02-20 | End: 2018-02-26 | Stop reason: HOSPADM

## 2018-02-20 RX ORDER — MORPHINE SULFATE 4 MG/ML
2 INJECTION, SOLUTION INTRAMUSCULAR; INTRAVENOUS
Status: DISCONTINUED | OUTPATIENT
Start: 2018-02-20 | End: 2018-02-20 | Stop reason: HOSPADM

## 2018-02-20 RX ORDER — INSULIN LISPRO 100 [IU]/ML
INJECTION, SOLUTION INTRAVENOUS; SUBCUTANEOUS ONCE
Status: DISCONTINUED | OUTPATIENT
Start: 2018-02-20 | End: 2018-02-20 | Stop reason: HOSPADM

## 2018-02-20 RX ORDER — SODIUM CHLORIDE 0.9 % (FLUSH) 0.9 %
5-10 SYRINGE (ML) INJECTION EVERY 8 HOURS
Status: DISCONTINUED | OUTPATIENT
Start: 2018-02-20 | End: 2018-02-26 | Stop reason: HOSPADM

## 2018-02-20 RX ORDER — LIDOCAINE HYDROCHLORIDE 20 MG/ML
INJECTION, SOLUTION EPIDURAL; INFILTRATION; INTRACAUDAL; PERINEURAL AS NEEDED
Status: DISCONTINUED | OUTPATIENT
Start: 2018-02-20 | End: 2018-02-20 | Stop reason: HOSPADM

## 2018-02-20 RX ORDER — HYDROMORPHONE HYDROCHLORIDE 2 MG/ML
INJECTION, SOLUTION INTRAMUSCULAR; INTRAVENOUS; SUBCUTANEOUS AS NEEDED
Status: DISCONTINUED | OUTPATIENT
Start: 2018-02-20 | End: 2018-02-20 | Stop reason: HOSPADM

## 2018-02-20 RX ORDER — SODIUM CHLORIDE 0.9 % (FLUSH) 0.9 %
5-10 SYRINGE (ML) INJECTION AS NEEDED
Status: DISCONTINUED | OUTPATIENT
Start: 2018-02-20 | End: 2018-02-26 | Stop reason: HOSPADM

## 2018-02-20 RX ORDER — SODIUM CHLORIDE, SODIUM LACTATE, POTASSIUM CHLORIDE, CALCIUM CHLORIDE 600; 310; 30; 20 MG/100ML; MG/100ML; MG/100ML; MG/100ML
25 INJECTION, SOLUTION INTRAVENOUS CONTINUOUS
Status: DISCONTINUED | OUTPATIENT
Start: 2018-02-20 | End: 2018-02-20 | Stop reason: HOSPADM

## 2018-02-20 RX ORDER — MIDAZOLAM HYDROCHLORIDE 1 MG/ML
INJECTION, SOLUTION INTRAMUSCULAR; INTRAVENOUS AS NEEDED
Status: DISCONTINUED | OUTPATIENT
Start: 2018-02-20 | End: 2018-02-20 | Stop reason: HOSPADM

## 2018-02-20 RX ORDER — ONDANSETRON 2 MG/ML
4 INJECTION INTRAMUSCULAR; INTRAVENOUS
Status: DISCONTINUED | OUTPATIENT
Start: 2018-02-20 | End: 2018-02-20 | Stop reason: HOSPADM

## 2018-02-20 RX ORDER — VANCOMYCIN HYDROCHLORIDE 1 G/20ML
INJECTION, POWDER, LYOPHILIZED, FOR SOLUTION INTRAVENOUS AS NEEDED
Status: DISCONTINUED | OUTPATIENT
Start: 2018-02-20 | End: 2018-02-20 | Stop reason: HOSPADM

## 2018-02-20 RX ORDER — ADHESIVE BANDAGE
30 BANDAGE TOPICAL DAILY PRN
Status: DISCONTINUED | OUTPATIENT
Start: 2018-02-20 | End: 2018-02-26 | Stop reason: HOSPADM

## 2018-02-20 RX ORDER — SUCCINYLCHOLINE CHLORIDE 20 MG/ML
INJECTION INTRAMUSCULAR; INTRAVENOUS AS NEEDED
Status: DISCONTINUED | OUTPATIENT
Start: 2018-02-20 | End: 2018-02-20 | Stop reason: HOSPADM

## 2018-02-20 RX ORDER — HYDROCODONE BITARTRATE AND ACETAMINOPHEN 5; 325 MG/1; MG/1
1 TABLET ORAL
Status: DISCONTINUED | OUTPATIENT
Start: 2018-02-20 | End: 2018-02-22

## 2018-02-20 RX ORDER — ONDANSETRON 2 MG/ML
INJECTION INTRAMUSCULAR; INTRAVENOUS AS NEEDED
Status: DISCONTINUED | OUTPATIENT
Start: 2018-02-20 | End: 2018-02-20 | Stop reason: HOSPADM

## 2018-02-20 RX ORDER — GLIMEPIRIDE 2 MG/1
1 TABLET ORAL 2 TIMES DAILY
Status: DISCONTINUED | OUTPATIENT
Start: 2018-02-20 | End: 2018-02-21

## 2018-02-20 RX ORDER — CARVEDILOL 3.12 MG/1
3.12 TABLET ORAL 2 TIMES DAILY WITH MEALS
Status: DISCONTINUED | OUTPATIENT
Start: 2018-02-20 | End: 2018-02-26 | Stop reason: HOSPADM

## 2018-02-20 RX ORDER — SODIUM CHLORIDE 0.9 % (FLUSH) 0.9 %
5-10 SYRINGE (ML) INJECTION AS NEEDED
Status: DISCONTINUED | OUTPATIENT
Start: 2018-02-20 | End: 2018-02-20 | Stop reason: HOSPADM

## 2018-02-20 RX ORDER — FAMOTIDINE 20 MG/1
20 TABLET, FILM COATED ORAL ONCE
Status: COMPLETED | OUTPATIENT
Start: 2018-02-20 | End: 2018-02-20

## 2018-02-20 RX ADMIN — ZOLPIDEM TARTRATE 5 MG: 5 TABLET ORAL at 21:51

## 2018-02-20 RX ADMIN — HYDROMORPHONE HYDROCHLORIDE 1 MG: 1 INJECTION, SOLUTION INTRAMUSCULAR; INTRAVENOUS; SUBCUTANEOUS at 21:50

## 2018-02-20 RX ADMIN — AMITRIPTYLINE HYDROCHLORIDE 10 MG: 10 TABLET, FILM COATED ORAL at 21:51

## 2018-02-20 RX ADMIN — PROPOFOL 150 MG: 10 INJECTION, EMULSION INTRAVENOUS at 13:27

## 2018-02-20 RX ADMIN — MORPHINE SULFATE 2 MG: 4 INJECTION, SOLUTION INTRAMUSCULAR; INTRAVENOUS at 10:46

## 2018-02-20 RX ADMIN — METFORMIN HYDROCHLORIDE 1000 MG: 500 TABLET, FILM COATED ORAL at 18:20

## 2018-02-20 RX ADMIN — MIDAZOLAM HYDROCHLORIDE 2 MG: 1 INJECTION, SOLUTION INTRAMUSCULAR; INTRAVENOUS at 13:23

## 2018-02-20 RX ADMIN — LIDOCAINE HYDROCHLORIDE 60 MG: 20 INJECTION, SOLUTION EPIDURAL; INFILTRATION; INTRACAUDAL; PERINEURAL at 13:27

## 2018-02-20 RX ADMIN — Medication 10 ML: at 21:55

## 2018-02-20 RX ADMIN — ONDANSETRON 4 MG: 2 INJECTION INTRAMUSCULAR; INTRAVENOUS at 15:29

## 2018-02-20 RX ADMIN — CARVEDILOL 3.12 MG: 3.12 TABLET, FILM COATED ORAL at 18:20

## 2018-02-20 RX ADMIN — FAMOTIDINE 20 MG: 20 TABLET, FILM COATED ORAL at 10:39

## 2018-02-20 RX ADMIN — SODIUM CHLORIDE, SODIUM LACTATE, POTASSIUM CHLORIDE, AND CALCIUM CHLORIDE: 600; 310; 30; 20 INJECTION, SOLUTION INTRAVENOUS at 14:00

## 2018-02-20 RX ADMIN — MORPHINE SULFATE 2 MG: 4 INJECTION, SOLUTION INTRAMUSCULAR; INTRAVENOUS at 10:35

## 2018-02-20 RX ADMIN — HYDROMORPHONE HYDROCHLORIDE 1 MG: 2 INJECTION, SOLUTION INTRAMUSCULAR; INTRAVENOUS; SUBCUTANEOUS at 13:44

## 2018-02-20 RX ADMIN — FENTANYL CITRATE 100 MCG: 50 INJECTION, SOLUTION INTRAMUSCULAR; INTRAVENOUS at 13:27

## 2018-02-20 RX ADMIN — EPHEDRINE SULFATE 10 MG: 50 INJECTION, SOLUTION INTRAVENOUS at 14:33

## 2018-02-20 RX ADMIN — SUCCINYLCHOLINE CHLORIDE 100 MG: 20 INJECTION INTRAMUSCULAR; INTRAVENOUS at 13:27

## 2018-02-20 RX ADMIN — CEFAZOLIN SODIUM 2 G: 1 INJECTION, POWDER, FOR SOLUTION INTRAMUSCULAR; INTRAVENOUS at 13:40

## 2018-02-20 RX ADMIN — HYDROMORPHONE HYDROCHLORIDE 0.5 MG: 2 INJECTION, SOLUTION INTRAMUSCULAR; INTRAVENOUS; SUBCUTANEOUS at 16:45

## 2018-02-20 RX ADMIN — MORPHINE SULFATE 2 MG: 4 INJECTION, SOLUTION INTRAMUSCULAR; INTRAVENOUS at 10:59

## 2018-02-20 RX ADMIN — SODIUM CHLORIDE, SODIUM LACTATE, POTASSIUM CHLORIDE, AND CALCIUM CHLORIDE 75 ML/HR: 600; 310; 30; 20 INJECTION, SOLUTION INTRAVENOUS at 18:20

## 2018-02-20 RX ADMIN — HYDROMORPHONE HYDROCHLORIDE 0.5 MG: 2 INJECTION, SOLUTION INTRAMUSCULAR; INTRAVENOUS; SUBCUTANEOUS at 17:01

## 2018-02-20 RX ADMIN — DEXAMETHASONE SODIUM PHOSPHATE 10 MG: 4 INJECTION, SOLUTION INTRA-ARTICULAR; INTRALESIONAL; INTRAMUSCULAR; INTRAVENOUS; SOFT TISSUE at 13:40

## 2018-02-20 NOTE — OP NOTES
[address] REPORT    Mino Reeder  MR#: [id number]  : 1945  ACCOUNT #: [de-identified]   DATE OF SERVICE: 2018    PREOPERATIVE DIAGNOSIS:  L4-L5 adjacent segment stenosis with disk herniation. POSTOPERATIVE DIAGNOSIS:  L4-L5 adjacent segment stenosis with disk herniation. PROCEDURES PERFORMED:  1. Removal of L3-L4 hardware. 2.  Pedicle screw instrumentation, L3 to L5.  3.  Left L4-L5 transforaminal lumbar interbody fusion. 4.  Posterolateral fusion, L4-L5. 5.  Left iliac crest needle aspiration for the purpose of grafting. 6.  Fluoroscopy. SURGEON:  Lina Dickerson MD.    ASSISTANT:  Ирина Prakash PA-C. ANESTHESIA:  General endotracheal.    ESTIMATED BLOOD LOSS:  50 mL. COMPLICATIONS:  None. SPECIMENS REMOVED:  None. IMPLANTS:  Globus    BRIEF HISTORY OF PRESENT ILLNESS:  (Please see admitting history and physical for full details). This patient is a 68-year-old gentleman who presents with a history of L3-L4 fusion and had a recent disk herniation that was addressed surgically. He presents with a large recurrence on the left at L4-L5 and extreme pain. He has failed conservative therapy including epidural steroid injections. DESCRIPTION OF PROCEDURE:  After informed consent was obtained, the patient was taken back to the operating room and placed under general anesthesia without event. He was given 2 grams of Ancef and 10 mg of Decadron. The left iliac crest was prepped with ChloraPrep and then draped in the usual manner. A stab incision was made, and Jamshidi needle was tapped into the iliac crest.  50 mL of bone marrow aspirate were obtained and then processed in the ART-21 system. The buffy coat was retrieved and brought up to volume with platelet depleted plasma. This was mixed with Signafuse putty and OsteoMatrix sponge. FloSeal was placed in the needle, and stylet was replaced. Needle was withdrawn. Pressure was held.   Prinaranzao was applied and allowed to dry. The patient was rolled in prone position on the operating table with arms in up position. All pressure points were padded. His back was prepped out with alcohol soaked 4 x 4's. Chlorhexidine scrub was performed and then blotted dry with a sterile towel. ChloraPrep was applied and allowed to dry. Standard draping ensued. The sarbjit-incisional line was infiltrated with 0.25% Marcaine with epinephrine. Aspirations were performed before each injection to rule out intravascular placement, which there was none. The old incision was opened and extended down to L4-L5. Subperiosteal takedown was carried out, and the old hardware was exposed. The hardware was removed. One of the locking caps was loose on the right at L4, but the screws had good purchase. He appeared to be fused at L3-L4. The 6.5 x 50 mm Creo (Globus) screws were placed bilaterally at L3-4 and L4-L5 with excellent purchase. Starter holes were drilled for L5 using AP and lateral fluoroscopy and standard anatomic landmarks. The holes were probed, and then a gearshift was used to create the trajectories. These holes were probed, and there was no evidence of any breach. A 6.5 x 45 mm screw was placed on the left. A 6.5 x 50 mm screw was placed on the right. There was excellent purchase. The area was copiously irrigated with bacitracin laden irrigation. The superior and inferior facets were drilled out, and the exiting traversing nerve roots were identified. The disk herniation was identified, and the epidural space was coagulated with bipolar electrocautery. Annulotomies were performed, and further disk herniation was removed. The Euel Kras was used to fish the rest of the disk herniation out at this level. The sac relaxed nicely. Cutting paddles were placed, and an 8 mm cutting paddle was found to be the correct size. Upbiting curet and rasp were used to help clean off and decorticate the endplates.   The disk space was packed with the aforementioned prepared bone graft, and this was tamped into the contralateral side. A Rise 8 x 10 x 22 expandable cage (Globus) was tamped in the disk space and fully expanded. The large ball tip probe was passed through the L4-L5 foramen and then around the L5 pedicle, with no evidence of any residual stenosis. The Alexey Tennille was used to probe medially, and there was no evidence of any residual stenosis. The wound was copiously irrigated with Bacitracin laden irrigation. FloSeal was applied. Rods were sized and bent. Rods were then placed, and locking caps were placed and tightened at L3-L4. Compression was placed across L4-L5, and then the locking caps were tightened. All locking caps were tightened to final tightness with a torque counter torque device. AP and lateral fluoroscopy showed good position of the instrumentation. The wound was copiously irrigated with bacitracin laden irrigation. Gelfoam duvets were placed over the exposed dura, and the posterolateral bone edges were decorticated. The bone was packed down on this on the right side. Vancomycin powder was sprayed on the wound. A Hemovac drain was placed. The fascial layer was closed with a row of interrupted 0 Vicryl suture. The subcutaneous layer was irrigated with bacitracin laden irrigation. Vancomycin powder was sprayed around this part of the wound. A layered closure was performed up to  the skin. A 3-0 Stratafix subcuticular was used to close the skin. Prineo was applied and allowed to dry. An Orbic spin was performed and showed good position of the instrumentation. There was nice improvement in lordosis. Free-hand EMGs were quiet. All screws stimulated satisfactorily. All counts were correct before closing. I updated his wife in the waiting area.       Sherryle Bolder, MD DV / [place]  D: 02/20/2018 15:59     T: 02/20/2018 17:16  JOB #: [id number]

## 2018-02-20 NOTE — PROGRESS NOTES
TRANSFER - IN REPORT:    Verbal report received from Adolfo Steel on Daysi Route  being received from PACU for routine post - op      Report consisted of patients Situation, Background, Assessment and   Recommendations(SBAR). Information from the following report(s) SBAR, OR Summary and Intake/Output was reviewed with the receiving nurse. Opportunity for questions and clarification was provided. 1750 Received pt via bed awake and alert in NAD. Wearing O2 via n/c at 2L. Matias removed in PACU and feels like he has to void, Urinal in place. Oriented to call bell, phone and IS with giving return demonstration. Dressing to back d/i with hemovac intact. Incision to L hip d/i.

## 2018-02-20 NOTE — IP AVS SNAPSHOT
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 [address] [facility] [id number] [phone] Du Lopez MD In 4 weeks for follow up 22 [facility] 200 [facility] [id number] [phone] Discharge Orders None A check brody indicates which time of day the medication should be taken. My Medications START taking these medications Instructions Each Dose to Equal  
 Morning Noon Evening Bedtime  
 bisacodyl 5 mg EC tablet Commonly known as:  DULCOLAX Your last dose was: Your next dose is: Take 2 Tabs by mouth daily. 10 mg  
    
   
   
   
  
 celecoxib 200 mg capsule Commonly known as:  CELEBREX Your last dose was: Your next dose is: Take 1 Cap by mouth daily for 90 days. 200 mg  
    
   
   
   
  
 gabapentin 300 mg capsule Commonly known as:  NEURONTIN Your last dose was: Your next dose is: Take 1 Cap by mouth three (3) times daily. Indications: NEUROPATHIC PAIN  
 300 mg HYDROcodone-acetaminophen  mg tablet Commonly known as:  Carlos Fraction Your last dose was: Your next dose is: Take 1 Tab by mouth every six (6) hours as needed for up to 7 days. Max Daily Amount: 4 Tabs. Indications: Pain 1 Tab  
    
   
   
   
  
 methocarbamol 750 mg tablet Commonly known as:  ROBAXIN Your last dose was: Your next dose is: Take 1 Tab by mouth four (4) times daily as needed. Indications: Muscle Spasm  
 750 mg  
    
   
   
   
  
 methylPREDNISolone 4 mg tablet Commonly known as:  Valere Cera Your last dose was: Your next dose is:    
   
   
 Start on Day#2 CONTINUE taking these medications Instructions Each Dose to Equal  
 Morning Noon Evening Bedtime  
 amitriptyline 10 mg tablet Commonly known as:  ELAVIL Your last dose was: Your next dose is: Take 10 mg by mouth nightly. 10 mg  
    
   
   
   
  
 aspirin 81 mg tablet Your last dose was: Your next dose is: Take 81 mg by mouth. 81 mg  
    
   
   
   
  
 carvedilol 3.125 mg tablet Commonly known as:  Campos Si Your last dose was: Your next dose is: Take 3.125 mg by mouth two (2) times daily (with meals). 3.125 mg CENTRUM SILVER Tab tablet Generic drug:  multivitamins-minerals-lutein Your last dose was: Your next dose is: Take  by mouth. CRESTOR 5 mg tablet Generic drug:  rosuvastatin Your last dose was: Your next dose is: Take  by mouth nightly. FISH OIL 1,000 mg Cap Generic drug:  omega-3 fatty acids-vitamin e Your last dose was: Your next dose is: Take 1 capsule by mouth. 1 Cap  
    
   
   
   
  
 glimepiride 1 mg tablet Commonly known as:  AMARYL Your last dose was: Your next dose is: Take 1 mg by mouth two (2) times a day. 1 mg metFORMIN 1,000 mg tablet Commonly known as:  GLUCOPHAGE Your last dose was: Your next dose is: Take 1,000 mg by mouth two (2) times a day. 1000 mg MICARDIS 20 mg tablet Generic drug:  telmisartan Your last dose was: Your next dose is: Take 20 mg by mouth daily. 20 mg  
    
   
   
   
  
 PriLOSEC 20 mg capsule Generic drug:  omeprazole Your last dose was: Your next dose is: Take 20 mg by mouth daily. 20 mg PROBIOTIC 4X 10-15 mg Tbec Generic drug:  B.infantis-B.ani-B.long-B.bifi Your last dose was: Your next dose is: Take  by mouth. PROSTATE THERAPY PO Your last dose was: Your next dose is: Take  by mouth. SYNTHROID 125 mcg tablet Generic drug:  levothyroxine Your last dose was: Your next dose is: Take 125 mcg by mouth Daily (before breakfast). 125 mcg  
    
   
   
   
  
 temazepam 30 mg capsule Commonly known as:  RESTORIL Your last dose was: Your next dose is: Take  by mouth nightly as needed for Sleep. STOP taking these medications MOTRIN 800 mg tablet Generic drug:  ibuprofen Where to Get Your Medications Information on where to get these meds will be given to you by the nurse or doctor. ! Ask your nurse or doctor about these medications  
  bisacodyl 5 mg EC tablet  
 celecoxib 200 mg capsule  
 gabapentin 300 mg capsule HYDROcodone-acetaminophen  mg tablet  
 methocarbamol 750 mg tablet  
 methylPREDNISolone 4 mg tablet Discharge Instructions DISCHARGE SUMMARY from Nurse PATIENT INSTRUCTIONS: 
 
 
F-face looks uneven A-arms unable to move or move unevenly S-speech slurred or non-existent T-time-call 911 as soon as signs and symptoms begin-DO NOT go Back to bed or wait to see if you get better-TIME IS BRAIN. Warning Signs of HEART ATTACK Call 911 if you have these symptoms: 
? Chest discomfort. Most heart attacks involve discomfort in the center of the chest that lasts more than a few minutes, or that goes away and comes back. It can feel like uncomfortable pressure, squeezing, fullness, or pain. ? Discomfort in other areas of the upper body. Symptoms can include pain or discomfort in one or both arms, the back, neck, jaw, or stomach. ? Shortness of breath with or without chest discomfort. ? Other signs may include breaking out in a cold sweat, nausea, or lightheadedness. Don't wait more than five minutes to call [address]! Fast action can save your life. Calling 911 is almost always the fastest way to get lifesaving treatment. Emergency Medical Services staff can begin treatment when they arrive  up to an hour sooner than if someone gets to the hospital by car. The discharge information has been reviewed with the patient. The patient verbalized understanding. Discharge medications reviewed with the patient and appropriate educational materials and side effects teaching were provided. Patient armband removed and shredded. ___________________________________________________________________________________________________________________________________ MyChart Announcement We are excited to announce that we are making your provider's discharge notes available to you in [employer].   You will see these notes when they are completed and signed by the physician that discharged you from your recent hospital stay. If you have any questions or concerns about any information you see in [facility], please call the Health Information Department where you were seen or reach out to your Primary Care Provider for more information about your plan of care. Introducing [facility]! [employer] introduces [facility] patient portal. Now you can access parts of your medical record, email your doctor's office, and request medication refills online. 1. In your internet browser, go to [email or web address] 2. Click on the First Time User? Click Here link in the Sign In box. You will see the New Member Sign Up page. 3. Enter your League[facility] Access Code exactly as it appears below. You will not need to use this code after youve completed the sign-up process. If you do not sign up before the expiration date, you must request a new code. · League[facility] Access Code: [email or web address] Expires: 5/27/2018  8:46 AM 
 
4. Enter the last four digits of your Social Security Number (xxxx) and Date of Birth (mm/dd/yyyy) as indicated and click Submit. You will be taken to the next sign-up page. 5. Create a Leaguevine ID. This will be your [facility] login ID and cannot be changed, so think of one that is secure and easy to remember. 6. Create a [facility] password. You can change your password at any time. 7. Enter your Password Reset Question and Answer. This can be used at a later time if you forget your password. 8. Enter your e-mail address. You will receive e-mail notification when new information is available in [address] 9. Click Sign Up. You can now view and download portions of your medical record. 10. Click the Download Summary menu link to download a portable copy of your medical information.  
 
If you have questions, please visit the Frequently Asked Questions section of the [employer]. Remember, MyChart is NOT to be used for urgent needs. For medical emergencies, dial 911. Now available from your iPhone and Android! Providers Seen During Your Hospitalization Provider Specialty Primary office phone Darrell Gleason MD Neurosurgery [phone] Your Primary Care Physician (PCP) Primary Care Physician Office Phone Office Fax Krupa Patton [phone] You are allergic to the following Allergen Reactions Oxycodone Nausea and Vomiting Has had recently and tolerated . Recent Documentation Height Weight BMI Smoking Status 1.778 m 87.5 kg 27.69 kg/m2 Former Smoker Emergency Contacts Name Discharge Info Relation Home Work Mobile Ada Vicente DISCHARGE CAREGIVER [3] Spouse [3]   [phone] Patient Belongings The following personal items are in your possession at time of discharge: 
  Dental Appliances: None  Visual Aid: Glasses, With patient      Home Medications: None   Jewelry: None  Clothing: Shirt, Socks, Footwear, Undergarments, Pants (everything given to wife)    Other Valuables: None Discharge Instructions Attachments/References LUMBAR SPINAL FUSION: POST-OP (ENGLISH) GABAPENTIN (BY MOUTH) (ENGLISH) CELECOXIB (BY MOUTH) (ENGLISH) HYDROCODONE/ACETAMINOPHEN (BY MOUTH) (ENGLISH) METHYLPREDNISOLONE (BY MOUTH) (ENGLISH) METHOCARBAMOL (BY MOUTH) (ENGLISH) Patient Handouts Lumbar Spinal Fusion: What to Expect at Home Your Recovery After surgery, you can expect your back to feel stiff and sore. You may have trouble sitting or standing in one position for very long and may need pain medicine in the weeks after your surgery. It may take 4 to 6 weeks to get back to doing simple activities, such as light housework. It may take 6 months to a year for your back to get better completely. You may need to wear a back brace while your back heals. And your doctor may have you go to physical therapy. If your job doesn't require physical labor, you will probably be able to go back to work after 1 or 2 months. If your job involves light physical labor, it may take 3 to 6 months. Most people whose jobs involved heavy labor can never return to those jobs. This care sheet gives you a general idea about how long it will take for you to recover. But each person recovers at a different pace. Follow the steps below to get better as quickly as possible. How can you care for yourself at home? Activity ? · Rest when you feel tired. Getting enough sleep will help you recover. ? · Try to walk each day. Start by walking a little more than you did the day before. Bit by bit, increase the amount you walk. Walking boosts blood flow and helps prevent pneumonia and constipation. Walking may also decrease your muscle soreness after surgery. ? · If advised by your doctor, you may need to avoid lifting anything that would cause excessive strain on your back. This may include a child, heavy grocery bags and milk containers, a heavy briefcase or backpack, cat litter or dog food bags, or a vacuum . ? · Avoid strenuous activities, such as bicycle riding, jogging, weight lifting, or aerobic exercise, until your doctor says it is okay. ? · Do not drive for 2 to 4 weeks after your surgery or until your doctor says it is okay. ? · Avoid riding in a car for more than 30 minutes at a time for 2 to 4 weeks after surgery. If you must ride in a car for a longer distance, stop often to walk and stretch your legs. ? · Try to change your position about every 30 minutes while sitting or standing. This will help decrease your back pain while you are healing. ? · You will probably need to take at least 4 to 6 weeks off from work. It depends on the type of work you do and how you feel. ? · You may have sex as soon as you feel able, but avoid positions that put stress on your back or cause pain. Diet ? · You can eat your normal diet. If your stomach is upset, try bland, low-fat foods like plain rice, broiled chicken, toast, and yogurt. ? · Drink plenty of fluids (unless your doctor tells you not to). ? · You may notice that your bowel movements are not regular right after your surgery. This is common. Try to avoid constipation and straining with bowel movements. You may want to take a fiber supplement every day. If you have not had a bowel movement after a couple of days, ask your doctor about taking a mild laxative. Medicines ? · Be safe with medicines. Take pain medicines exactly as directed. ¨ If the doctor gave you a prescription medicine for pain, take it as prescribed. ¨ If you are not taking a prescription pain medicine, ask your doctor if you can take an over-the-counter medicine. ? · If your doctor prescribed antibiotics, take them as directed. Do not stop taking them just because you feel better. You need to take the full course of antibiotics. ? · If you think your pain medicine is making you sick to your stomach: 
¨ Take your medicine after meals (unless your doctor has told you not to). ¨ Ask your doctor for a different pain medicine. Incision care ? · You will be given specific instructions about how to care for the cuts (incisions) the doctor made. The instructions will depend on the type of materials used to close the cut. Exercise ? · Do back exercises as instructed by your doctor. ? · Your doctor may advise you to work with a physical therapist to improve the strength and flexibility of your back. Other instructions ? · To reduce stiffness and help sore muscles, use a warm water bottle, a heating pad set on low, or a warm cloth on your back. Do not put heat right over the incision. Do not go to sleep with a heating pad on your skin. Follow-up care is a key part of your treatment and safety. Be sure to make and go to all appointments, and call your doctor if you are having problems. It's also a good idea to know your test results and keep a list of the medicines you take. When should you call for help? Call 911 anytime you think you may need emergency care. For example, call if: 
? · You passed out (lost consciousness). ? · You have sudden chest pain and shortness of breath, or you cough up blood. ? · You are unable to move a leg at all. ?Call your doctor now or seek immediate medical care if: 
? · You have pain that does not get better after you take pain pills. ? · You have new or worse symptoms in your legs or buttocks. Symptoms may include: ¨ Numbness or tingling. ¨ Weakness. ¨ Pain. ? · You lose bladder or bowel control. ? · You have loose stitches, or your incision comes open. ? · You have blood or fluid draining from the incision. ? · You have signs of infection, such as: 
¨ Increased pain, swelling, warmth, or redness. ¨ Pus draining from the incision. ¨ A fever. ? Watch closely for any changes in your health, and be sure to contact your doctor if: 
? · You do not have a bowel movement after taking a laxative. ? · You are not getting better as expected. Where can you learn more? Go to http://barrington-alejandra.info/. Enter Y882 in the search box to learn more about \"Lumbar Spinal Fusion: What to Expect at Home. \" Current as of: March 21, 2017 Content Version: 11.4 © 5230-6267 [employer]. Care instructions adapted under license by [employer] (which disclaims liability or warranty for this information). If you have questions about a medical condition or this instruction, always ask your healthcare professional. [address] any warranty or liability for your use of this information. Gabapentin (By mouth) Gabapentin (harish-a-PEN-tin) Treats seizures and pain caused by shingles. Brand Name(s): ACTIVE-PAC with Gabapentin, Convenience Cipriano, Cyclo/Arun 10/300 Pack, [employer]V, Arun-V, Gralise, Gralise Starter Pack, Neurontin, [employer] Kit There may be other brand names for this medicine. When This Medicine Should Not Be Used: This medicine is not right for everyone. Do not use it if you had an allergic reaction to gabapentin. How to Use This Medicine:  
Capsule, Liquid, Tablet · Take your medicine as directed. Your dose may need to be changed several times to find what works best for you. If you have epilepsy, do not allow more than 12 hours to pass between doses. · Capsule: Swallow the capsule whole with plenty of water. Do not open, crush, or chew it. · Gralise® tablet: Swallow the tablet whole . Do not crush, break, or chew it. · Neurontin® tablet: If you break a tablet into 2 pieces, use the second half as your next dose. If you don't use it within 28 days, throw it away. · Measure the oral liquid medicine with a marked measuring spoon, oral syringe, or medicine cup. · This medicine should come with a Medication Guide. Ask your pharmacist for a copy if you do not have one. · Missed dose: Take a dose as soon as you remember. If it is almost time for your next dose, wait until then and take a regular dose. Do not take extra medicine to make up for a missed dose. · Store the medicine in a closed container at room temperature, away from heat, moisture, and direct light. Store the Neurontin® oral liquid in the refrigerator. Do not freeze. Drugs and Foods to Avoid: Ask your doctor or pharmacist before using any other medicine, including over-the-counter medicines, vitamins, and herbal products. · Some medicines can affect how gabapentin works. Tell your doctor if you also use any of the following: ¨ Hydrocodone ¨ Morphine · If you take an antacid, wait at least 2 hours before you take gabapentin. · Tell your doctor if you use anything else that makes you sleepy. Some examples are allergy medicine, narcotic pain medicine, and alcohol. Warnings While Using This Medicine: · Tell your doctor if you are pregnant or breastfeeding, or if you have kidney problems or are receiving dialysis. Tell your doctor if you have a history of depression or mental health problems. · This medicine may increase depression or thoughts of suicide. Tell your doctor right away if you start to feel more depressed or think about hurting yourself. · This medicine may cause a serious allergic reaction called multiorgan hypersensitivity, which can damage organs and be life-threatening. · Do not stop using this medicine suddenly. Your doctor will need to slowly decrease your dose before you stop it completely. If you take this medicine to prevent seizures, your seizures may return or occur more often if you stop this medicine suddenly. · This medicine may make you dizzy or drowsy. Do not drive or do anything else that could be dangerous until you know how this medicine affects you. · Tell any doctor or dentist who treats you that you are using this medicine. This medicine may affect certain medical test results. · Your doctor will check your progress and the effects of this medicine at regular visits. Keep all appointments. · Keep all medicine out of the reach of children. Never share your medicine with anyone. Possible Side Effects While Using This Medicine:  
Call your doctor right away if you notice any of these side effects: · Allergic reaction: Itching or hives, swelling in your face or hands, swelling or tingling in your mouth or throat, chest tightness, trouble breathing · Behavior problems, aggression, restlessness, trouble concentrating, moodiness (especially in children) · Blistering, peeling, red skin rash · Change in how much or how often you urinate, bloody or cloudy urine, 
 · Chest pain, fast heartbeat, trouble breathing · Dark urine or pale stools, nausea, vomiting, loss of appetite, stomach pain, yellow skin or eyes · Fever, rash, swollen or tender glands in the neck, armpit, or groin · Problems with coordination, shakiness, unsteadiness · Rapid weight gain, swelling in your hands, ankles, or feet · Unusual moods or behaviors, thoughts of hurting yourself, feeling depressed If you notice these less serious side effects, talk with your doctor: · Dizziness, drowsiness, sleepiness, tiredness If you notice other side effects that you think are caused by this medicine, tell your doctor. Call your doctor for medical advice about side effects. You may report side effects to FDA at 4-923-FDA-0846 © 2017 [address] Information is for End User's use only and may not be sold, redistributed or otherwise used for commercial purposes. The above information is an  only. It is not intended as medical advice for individual conditions or treatments. Talk to your doctor, nurse or pharmacist before following any medical regimen to see if it is safe and effective for you. Celecoxib (By mouth) Celecoxib (vtg-r-GTH-ib) Treats pain. This medicine is an NSAID. Brand Name(s): CeleBREX, SmartRx CapXib Kit There may be other brand names for this medicine. When This Medicine Should Not Be Used: This medicine is not right for everyone. Do not use it if you had an allergic reaction (including asthma) to celecoxib, aspirin, NSAIDs, or a sulfa drug (such as sulfamethoxazole). Do not use this medicine right before or right after coronary artery bypass graft (CABG). How to Use This Medicine:  
Capsule · Your doctor will tell you how much medicine to use. Do not use more than directed. · It is best to take this medicine with food or milk so it does not upset your stomach.  
· Use this medicine for the shortest time possible and in the smallest dose possible. This will help lower the risk of side effects. · If you cannot swallow the capsule, you may open it and pour the medicine into a teaspoon of applesauce. Stir the mixture well and swallow right away. Drink enough water to make sure you swallow all of the medicine. · This medicine should come with a Medication Guide. Ask your pharmacist for a copy if you do not have one. · Missed dose: Take a dose as soon as you remember. If it is almost time for your next dose, wait until then and take a regular dose. Do not take extra medicine to make up for a missed dose. · Store the medicine in a closed container at room temperature, away from heat, moisture, and direct light. Any medicine that has been mixed with applesauce may be stored in a refrigerator and used within 6 hours. Drugs and Foods to Avoid: Ask your doctor or pharmacist before using any other medicine, including over-the-counter medicines, vitamins, and herbal products. · Some medicines and foods can affect how celecoxib works. Tell your doctor if you are taking any of the following: ¨ A blood thinner, such as warfarin ¨ A diuretic (water pill), such as furosemide, hydrochlorothiazide (HCTZ), torsemide ¨ Blood pressure medicine, such as enalapril, lisinopril, losartan, olmesartan, valsartan ¨ Fluconazole ¨ Lithium ¨ Other pain or arthritis medicine, such as aspirin, diclofenac, ibuprofen, naproxen ¨ Steroid medicine, such as hydrocortisone, methylprednisolone, prednisone · Do not drink alcohol while you are using this medicine. Warnings While Using This Medicine: · Tell your doctor if you are pregnant or breastfeeding. Do not use this medicine during the later part of pregnancy, unless your doctor tells you to.  
· Tell your doctor if you have a history of ulcers or other stomach problems, kidney or liver disease, anemia, aspirin-sensitive asthma, high blood pressure, congestive heart failure, or other heart or circulation problems. · This medicine may cause the following problems: ¨ A serious liver problem ¨ Bleeding in your stomach or intestines ¨ Increased risk for a heart attack or stroke ¨ Risk for disseminated intravascular coagulation (bleeding problem) in children younger than 18 years · Tell any doctor or dentist who treats you that you are using this medicine. · Your doctor will do lab tests at regular visits to check on the effects of this medicine. Keep all appointments. · Keep all medicine out of the reach of children. Never share your medicine with anyone. Possible Side Effects While Using This Medicine:  
Call your doctor right away if you notice any of these side effects: · Allergic reaction: Itching or hives, swelling in your face or hands, swelling or tingling in your mouth or throat, chest tightness, trouble breathing · Blistering, peeling, red skin rash · Bloody or black, tarry stools · Change in how much or how often you urinate, bloody or cloudy urine · Chest pain, shortness of breath, or coughing up blood · Fast or slow heartbeat · Dark urine or pale stools, nausea, vomiting, loss of appetite, stomach pain, yellow skin or eyes · Numbness or weakness in your arm or leg, or on one side of your body · Pain in your calf · Shortness of breath, cold sweat, and bluish skin · Sudden or severe headache, dizziness, or problems with vision, speech, or walking · Swelling in your hands, ankles, or feet, rapid weight gain · Unusual tiredness or weakness, pale skin · Vomiting blood or material that looks like coffee grounds If you notice these less serious side effects, talk with your doctor: · Mild skin rash · Muscle or joint pain If you notice other side effects that you think are caused by this medicine, tell your doctor. Call your doctor for medical advice about side effects. You may report side effects to FDA at 4-280-WBT-6101 © 2017 [address]  Information is for End User's use only and may not be sold, redistributed or otherwise used for commercial purposes. The above information is an  only. It is not intended as medical advice for individual conditions or treatments. Talk to your doctor, nurse or pharmacist before following any medical regimen to see if it is safe and effective for you. Hydrocodone/Acetaminophen (By mouth) Acetaminophen (a-elzz-s-MIN-oh-fen), Hydrocodone Bitartrate (wkq-uwqw-VJX-done bye-TAR-trate) Treats pain. This medicine contains a narcotic pain reliever. Brand Name(s): Hycet, Lorcet, Lorcet HD, Lorcet Plus, Lortab 10/325, Lortab 5/325, Lortab 7.5/325, Lortab Elixir, Norco, Verdrocet, Vicodin, Vicodin ES, Vicodin HP, Xodol, Xodol 5/300 There may be other brand names for this medicine. When This Medicine Should Not Be Used: This medicine is not right for everyone. Do not use it if you had an allergic reaction to acetaminophen, hydrocodone, or other narcotic medicines, or stomach or bowel blockage (including paralytic ileus). How to Use This Medicine:  
Capsule, Liquid, Tablet · Your doctor will tell you how much medicine to use. Do not use more than directed. · An overdose can be dangerous. Follow directions carefully so you do not get too much medicine at one time. · Oral liquid: Measure the oral liquid medicine with a marked measuring spoon, oral syringe, or medicine cup. · Drink plenty of liquids to help avoid constipation. · This medicine should come with a Medication Guide. Ask your pharmacist for a copy if you do not have one. · Missed dose: Take a dose as soon as you remember. If it is almost time for your next dose, wait until then and take a regular dose. Do not take extra medicine to make up for a missed dose.  
· Store the medicine in a closed container at room temperature, away from heat, moisture, and direct light. Flush any unused Norco® tablets down the toilet. Drugs and Foods to Avoid: Ask your doctor or pharmacist before using any other medicine, including over-the-counter medicines, vitamins, and herbal products. · Do not use this medicine if you are using or have used an MAO inhibitor within the past 14 days. · Some medicines can affect how hydrocodone/acetaminophen works. Tell your doctor if you are using any of the following: ¨ Carbamazepine, erythromycin, ketoconazole, mirtazapine, phenytoin, rifampin, ritonavir, tramadol, trazodone ¨ Diuretic (water pill) ¨ Medicine to treat depression or mental health problems ¨ Medicine to treat migraine headaches ¨ Phenothiazine medicine · Tell your doctor if you use anything else that makes you sleepy. Some examples are allergy medicine, narcotic pain medicine, and alcohol. Tell your doctor if you are using buprenorphine, butorphanol, nalbuphine, pentazocine, or a muscle relaxer. · Do not drink alcohol while you are using this medicine. Acetaminophen can damage your liver, and your risk is higher if you also drink alcohol. Warnings While Using This Medicine: · Tell your doctor if you are pregnant or breastfeeding, or if you have kidney disease, liver disease, lung or breathing problems, gallbladder or pancreas problems, an underactive thyroid, Miner disease, prostate problems, trouble urinating, stomach problems, or a history of head injury or brain tumor, seizures, alcohol or drug addiction. · This medicine may cause the following problems:  
¨ High risk of overdose, which can lead to death ¨ Respiratory depression (serious breathing problem that can be life-threatening) ¨ Liver problems ¨ Serious skin reactions ¨ Serotonin syndrome (when used with certain medicines) · This medicine can be habit-forming. Do not use more than your prescribed dose. Call your doctor if you think your medicine is not working. · This medicine may make you dizzy or drowsy. Do not drive or doing anything else that could be dangerous until you know how this medicine affects you. · This medicine contains acetaminophen. Read the labels of all other medicines you are using to see if they also contain acetaminophen, or ask your doctor or pharmacist. Jose Bennett not use more than 4 grams (4,000 milligrams) total of acetaminophen in one day. · Tell any doctor or dentist who treats you that you are using this medicine. This medicine may affect certain medical test results. · This medicine may cause constipation, especially with long-term use. Ask your doctor if you should use a laxative to prevent and treat constipation. · This medicine could cause infertility. Talk with your doctor before using this medicine if you plan to have children. · Keep all medicine out of the reach of children. Never share your medicine with anyone. Possible Side Effects While Using This Medicine:  
Call your doctor right away if you notice any of these side effects: · Allergic reaction: Itching or hives, swelling in your face or hands, swelling or tingling in your mouth or throat, chest tightness, trouble breathing · Anxiety, restlessness, fast heartbeat, fever, sweating, muscle spasms, twitching, diarrhea, seeing or hearing things that are not there · Blistering, peeling, red skin rash · Blue lips, fingernails, or skin · Dark urine or pale stools, loss of appetite, nausea or vomiting, stomach pain, yellow skin or eyes · Extreme weakness, shallow breathing, slow heartbeat, sweating, seizures, cold or clammy skin · Lightheadedness, dizziness, fainting If you notice these less serious side effects, talk with your doctor: · Constipation, nausea, vomiting · Tiredness or sleepiness If you notice other side effects that you think are caused by this medicine, tell your doctor. Call your doctor for medical advice about side effects.  You may report side effects to FDA at 3-311-FDA-9026 © 2017 [address] Information is for End User's use only and may not be sold, redistributed or otherwise used for commercial purposes. The above information is an  only. It is not intended as medical advice for individual conditions or treatments. Talk to your doctor, nurse or pharmacist before following any medical regimen to see if it is safe and effective for you. Methylprednisolone (By mouth) Methylprednisolone (meth-il-pred-NIS-oh-lone) Treats many diseases and conditions, including problems related to inflammation. This medicine is a corticosteroid. Brand Name(s): Medrol, Medrol Dosepak There may be other brand names for this medicine. When This Medicine Should Not Be Used: This medicine is not right for everyone. Do not use it if you had an allergic reaction to methylprednisolone or if you have a fungus infection. How to Use This Medicine:  
Tablet · Take your medicine as directed. Your dose may need to be changed several times to find what works best for you. · If you are using this medicine for an ongoing illness, your dose may need to be changed occasionally. Some people take this medicine only every other day, which helps to decrease side effects. · Take your medicine in the morning, unless your doctor tells you otherwise. · It is best to take this medicine with food or milk. · Missed dose: Take a dose as soon as you remember. If it is almost time for your next dose, wait until then and take a regular dose. Do not take extra medicine to make up for a missed dose. · Store the medicine in a closed container at room temperature, away from heat, moisture, and direct light. Drugs and Foods to Avoid: Ask your doctor or pharmacist before using any other medicine, including over-the-counter medicines, vitamins, and herbal products. · Some foods and medicines can affect how methylprednisolone works.  Tell your doctor if you use any of the following: ¨ Cyclosporine ¨ Phenobarbital, phenytoin ¨ Rifampin ¨ Ketoconazole ¨ Aspirin, especially high doses ¨ Blood thinner, such as warfarin ¨ Diabetes medicine · This medicine may interfere with vaccines. Ask your doctor before you get a flu shot or any other vaccines. Warnings While Using This Medicine: · Tell your doctor if you are pregnant or breastfeeding, or if you have kidney problems, liver disease, heart failure, high blood pressure, osteoporosis, blood clotting problems, or thyroid problems. Also tell your doctor if you have had mental or emotional problems (such as depression) or stomach or bowel problems (such as an ulcer or diverticulitis). · This medicine may cause the following problems: ¨ Mood or behavior changes ¨ Higher blood pressure, retaining water, changes in salt or potassium levels ¨ Cataracts or glaucoma (with long-term use) ¨ Slow growth in children (with long-term use) · Do not stop using this medicine suddenly. Your doctor will need to slowly decrease your dose before you stop it completely. · This medicine could cause you to get infections more easily. Tell your doctor right away if you have an infection or if you are exposed to chickenpox, measles, or other serious infection. Tell your doctor if you had a serious infection in the past, such as tuberculosis, herpes, or Strongyloides (threadworm). · Tell your doctor about any extra stress or anxiety in your life. Your dose might need to be changed for a short time. · Tell any doctor or dentist who treats you that you are using this medicine. · Keep all medicine out of the reach of children. Never share your medicine with anyone. Possible Side Effects While Using This Medicine:  
Call your doctor right away if you notice any of these side effects: · Allergic reaction: Itching or hives, swelling in your face or hands, swelling or tingling in your mouth or throat, chest tightness, trouble breathing · Dark freckles, skin color changes, coldness, weakness, tiredness, nausea, vomiting, weight loss · Depression, unusual thoughts, feelings, or behaviors, trouble sleeping · Fever, chills, cough, sore throat, and body aches · Severe stomach pain, nausea, vomiting, or red or black stools · Skin changes or growths · Swelling in your hands, ankles, or feet, rapid weight gain · Trouble seeing, eye pain, headache If you notice these less serious side effects, talk with your doctor: · Increased appetite · Round, puffy face · Weight gain around your neck, upper back, breast, face, or waist 
If you notice other side effects that you think are caused by this medicine, tell your doctor. Call your doctor for medical advice about side effects. You may report side effects to FDA at 4-014-FDA-4892 © 2017 [address]  Information is for End User's use only and may not be sold, redistributed or otherwise used for commercial purposes. The above information is an  only. It is not intended as medical advice for individual conditions or treatments. Talk to your doctor, nurse or pharmacist before following any medical regimen to see if it is safe and effective for you. Methocarbamol (By mouth) Methocarbamol (meth-oh-ANDREEA-ba-mol) Treats muscle pain and spasms. Brand Name(s): Robaxin, Robaxin-[id number] There may be other brand names for this medicine. When This Medicine Should Not Be Used: This medicine is not right for everyone. Do not use it if you had an allergic reaction to methocarbamol. How to Use This Medicine:  
Tablet · Take your medicine as directed. Your dose may need to be changed several times to find what works best for you. · Missed dose: Take a dose as soon as you remember. If it is almost time for your next dose, wait until then and take a regular dose.  Do not take extra medicine to make up for a missed dose. · Store the medicine in a closed container at room temperature, away from heat, moisture, and direct light. Drugs and Foods to Avoid: Ask your doctor or pharmacist before using any other medicine, including over-the-counter medicines, vitamins, and herbal products. · Some medicines can affect how methocarbamol works. Tell your doctor if you are using pyridostigmine. · Do not drink alcohol while you are using this medicine. · Tell your doctor if you use anything else that makes you sleepy. Some examples are allergy medicine, narcotic pain medicine, and alcohol. Warnings While Using This Medicine: · Tell your doctor if you are pregnant or breastfeeding, or if you have kidney disease, liver disease, or myasthenia gravis. · This medicine may make you dizzy or drowsy. Do not drive or do anything that could be dangerous until you know how this medicine affects you. · Tell any doctor or dentist who treats you that you are using this medicine. This medicine may affect certain medical test results. · Your doctor will check your progress and the effects of this medicine at regular visits. Keep all appointments. · Keep all medicine out of the reach of children. Never share your medicine with anyone. Possible Side Effects While Using This Medicine:  
Call your doctor right away if you notice any of these side effects: · Allergic reaction: Itching or hives, swelling in your face or hands, swelling or tingling in your mouth or throat, chest tightness, trouble breathing · Blurred vision, redness, pain, or swelling of the eyes · Dark urine or pale stools, nausea, vomiting, loss of appetite, stomach pain, yellow skin or eyes · Fever · Lightheadedness, dizziness, fainting · Seizures · Slow heartbeat · Unusual bleeding, bruising, or weakness If you notice these less serious side effects, talk with your doctor: · Confusion, trouble sleeping · Headache · Warmth or redness in your face, neck, arms, or upper chest 
If you notice other side effects that you think are caused by this medicine, tell your doctor. Call your doctor for medical advice about side effects. You may report side effects to FDA at 1-126-FDA-6491 © 2017 [address] Information is for End User's use only and may not be sold, redistributed or otherwise used for commercial purposes. The above information is an  only. It is not intended as medical advice for individual conditions or treatments. Talk to your doctor, nurse or pharmacist before following any medical regimen to see if it is safe and effective for you. Please provide this summary of care documentation to your next provider. Signatures-by signing, you are acknowledging that this After Visit Summary has been reviewed with you and you have received a copy. Patient Signature:  ____________________________________________________________ Date:  ____________________________________________________________  
  
Ja Payor Provider Signature:  ____________________________________________________________ Date:  ____________________________________________________________

## 2018-02-20 NOTE — CONSULTS
Internal Medicine Consult          Consult Requested By: Dr. Selin Mcgrath, neurosurgery    Subjective     HPI: Sade Pop is a 67 y.o. male with a PMHx of GERD, DM-II, HLD, HTN, chronic back pain, hypothyroidism who we were consulted for medical management while undergoing lumbar neurosurgical procedure. He admits to significant low back pain and LLE at this time and is currently waiting in pre-op holding prior to procedure. The pain medication he has received thus far is helping only a little bit. He has MRI evidence of large disc herniation below L3-L4. He had recent epidural without relief as well. He denies any bladder or bowel incontinence. PMHx:  Past Medical History:   Diagnosis Date    Acute tonsillitis     Back pain     Community acquired pneumonia     Degenerative disc disease     Diverticulosis     DM type 2 (diabetes mellitus, type 2) ([facility] [address]     Dyslipidemia     Frequency of urination     GERD (gastroesophageal reflux disease)     H/O joint replacement     Hearing reduced     Hypercholesteremia     Hyperlipidemia     Hypertension     Hypothyroidism     Kidney stones     Neuropathy     Nocturia     Radiculopathy     Sleep apnea     Does not use CPAP. Never had sleep study    Thyroid disease     Varicella     Vision decreased        PSurgHx:  Past Surgical History:   Procedure Laterality Date    HX CHOLECYSTECTOMY      HX HERNIA REPAIR       x 3    HX LUMBAR DISKECTOMY  2007    x2    HX LUMBAR FUSION      HX OTHER SURGICAL      ORTHOPEDIC SURGERY    TOTAL HIP ARTHROPLASTY      Both hips       SocialHx:  Social History     Social History    Marital status:      Spouse name: N/A    Number of children: N/A    Years of education: N/A     Occupational History    Not on file.      Social History Main Topics    Smoking status: Former Smoker     Quit date: 7/31/1984    Smokeless tobacco: Never Used    Alcohol use 5.4 oz/week     7 Standard drinks or equivalent, 2 Shots of liquor per week      Comment: daily    Drug use: No    Sexual activity: Not on file     Other Topics Concern    Not on file     Social History Narrative       FamilyHx:  Family History   Problem Relation Age of Onset    Diabetes Father     Dementia Mother        Prior to Admission Medications   Prescriptions Last Dose Informant Patient Reported? Taking? B.infantis-B.ani-B.long-B.bifi (PROBIOTIC 4X) 10-15 mg TbEC 2/16/2018  Yes Yes   Sig: Take  by mouth. M-17/NETTLE/PUMPK/SAW PALMET (PROSTATE THERAPY PO) 2/16/2018  Yes Yes   Sig: Take  by mouth. amitriptyline (ELAVIL) 10 mg tablet 2/19/2018 at Unknown time  Yes Yes   Sig: Take 10 mg by mouth nightly. aspirin 81 mg tablet 2/16/2018  Yes Yes   Sig: Take 81 mg by mouth. carvedilol (COREG) 3.125 mg tablet 2/20/2018 at Unknown time  Yes Yes   Sig: Take 3.125 mg by mouth two (2) times daily (with meals). glimepiride (AMARYL) 1 mg tablet 2/19/2018 at Unknown time  Yes Yes   Sig: Take 1 mg by mouth two (2) times a day. ibuprofen (MOTRIN) 800 mg tablet 2/16/2018  Yes No   Sig: Take  by mouth every six (6) hours as needed. levothyroxine (SYNTHROID) 125 mcg tablet 2/19/2018 at Unknown time  Yes Yes   Sig: Take 125 mcg by mouth Daily (before breakfast). metFORMIN (GLUCOPHAGE) 1,000 mg tablet 2/19/2018 at Unknown time  Yes Yes   Sig: Take 1,000 mg by mouth two (2) times a day. multivitamins-minerals-lutein (CENTRUM SILVER) Tab 2/19/2018 at Unknown time  Yes Yes   Sig: Take  by mouth. omega-3 fatty acids-vitamin e (FISH OIL) 1,000 mg cap 2/16/2018  Yes Yes   Sig: Take 1 capsule by mouth. omeprazole (PRILOSEC) 20 mg capsule 2/19/2018 at Unknown time  Yes Yes   Sig: Take 20 mg by mouth daily. rosuvastatin (CRESTOR) 5 mg tablet 2/19/2018 at Unknown time  Yes Yes   Sig: Take  by mouth nightly. telmisartan (MICARDIS) 20 mg tablet 2/19/2018 at Unknown time  Yes Yes   Sig: Take 20 mg by mouth daily.    temazepam (RESTORIL) 30 mg capsule 2/19/2018 at Unknown time  Yes Yes   Sig: Take  by mouth nightly as needed for Sleep. Facility-Administered Medications: None       Review of Systems:  CONST: Fever, weight loss, fatigue or chills  HEENT: Recent changes in vision, vertigo, epistaxis, dysphagia and hoarseness  CV: Chest pain, palpitations, HTN, edema and varicosities  RESP: Cough, shortness of breath, wheezing, hemoptysis, snoring and reactive airway disease  GI: Nausea, vomiting, abdominal pain, change in bowel habits, hematochezia, melena, and GERD   : Hematuria, dysuria, frequency, urgency, nocturia and stress urinary incontinence   MS: Weakness, back pain, joint pain and arthritis  ENDO: Diabetes, thyroid disease, polyuria, polydipsia, polyphagia, poor wound healing, heat intolerance, cold intolerance  LYMPH/HEME: Anemia, bruising and history of blood transfusions  INTEG: Dermatitis, abnormal moles  NEURO: Dizziness, headache, fainting, seizures and stroke  PSYCH: Anxiety and depression        Objective      Visit Vitals    BP (!) 136/93 (BP 1 Location: Left arm, BP Patient Position: At rest)    Pulse 79    Temp 96.9 °F (36.1 °C)    Resp 21    Ht 5' 10\" (1.778 m)    Wt 87.5 kg (193 lb)    SpO2 97%    BMI 27.69 kg/m2       Physical Exam:  General Appearance: NAD, conversant  HENT: normocephalic/atraumatic, moist mucus membranes  Lungs: CTA with normal respiratory effort  CV: RRR, no m/r/g  Abdomen: soft, non-tender, normal bowel sounds  Extremities: no cyanosis, no peripheral edema  Neuro: moves all extremities w/ weakness LLE, no focal deficits  Psych: appropriate affect, alert and oriented to person, place and time    Laboratory Studies:  BMP: No results found for: NA, K, CL, CO2, AGAP, GLU, BUN, CREA, GFRAA, GFRNA  CBC: No results found for: WBC, HGB, HGBEXT, HCT, HCTEXT, PLT, PLTEXT, HGBEXT, HCTEXT, PLTEXT    Imaging Reviewed:  No results found.       Assessment/Plan     Active Hospital Problems    Diagnosis Date Noted  Herniation of nucleus pulposus 02/20/2018    Adjacent segment disease with spinal stenosis 02/20/2018    Thyroid disease 02/20/2018    Sleep apnea 02/20/2018     Does not use CPAP. Never had sleep study      Hyperlipidemia 02/20/2018    Hypertension 02/20/2018    GERD (gastroesophageal reflux disease) 02/20/2018    DM type 2 (diabetes mellitus, type 2) ([facility] [address] 02/20/2018     - Diet and mobilization per primary team  - PT/OT  - Pain control PRN  - SSI and accuchecks  - Cont acceptable home medications for chronic conditions in AM  - DVT protocol    I reviewed all available labs and imaging that were available prior to my encounter. We appreciate the consultation for medical management and appreciate being able to be involved with their care during hospitalization.       Jasmyne Zamudio DO  Internal Medicine, Hospitalist  Pager: [phone] [facility]

## 2018-02-20 NOTE — PERIOP NOTES
Rec'd care of pt from OR via bed. Resp even and unlabored. Attached to monitor. VSS. OR, MAR anesthesia report acknowledged. Accu check 118 with no coverage needed. Will cont to monitor.

## 2018-02-20 NOTE — ANESTHESIA POSTPROCEDURE EVALUATION
Post-Anesthesia Evaluation & Assessment    Visit Vitals    BP (!) 135/98    Pulse (!) 107    Temp 36.9 °C (98.4 °F)    Resp 10    Ht 5' 10\" (1.778 m)    Wt 87.5 kg (193 lb)    SpO2 100%    BMI 27.69 kg/m2       Nausea/Vomiting controlled    Post-operative hydration adequate. Mental status & Level of consciousness: alert and oriented x 3    Neurological status: moves all extremities, sensation grossly intact    Pulmonary status: airway patent, no supplemental oxygen required    Complications related to anesthesia: none    Patient has met all discharge requirements.  5 on 0-10 pain scale    Additional comments:        Ирина Turner, CRNA

## 2018-02-20 NOTE — BRIEF OP NOTE
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    Procedure(s):  lumbar 4,5  TRANSFORAMINAL LUMBAR INTERBODY FUSION with pedicle screw placement , left iliac crest needle aspiration and exploration of hardware and fusion, hardware removal  Surgeon(s) and Role:     * Yobany Dye MD - Primary         Assistant Staff: None      Surgical Staff:  Circ-1: Krysta Ferrari RN  Circ-Relief: Diana Flores  Radiology Technician: Ba Rausch  Scrub Tech-1: Mayur Escalante  Surg Asst-1: Christiansen Furnace  Event Time In   Incision Start 1405   Incision Close      Anesthesia: General   Estimated Blood Loss: 50 cc  Specimens: * No specimens in log *   Findings: as expected   Complications: none  Implants:   Implant Name Type Inv.  Item Serial No.  Lot No. LRB No. Used Action   sorrento bone graft sub. strip 10 cm x0.6 cm    [facility] [id number] N/A 2 Implanted   GRAFT BNE BIOACTV INTERFC 1GM --  - [id number]  GRAFT BNE BIOACTV INTERFC 1GM --   [facility] [id number] N/[id number] 1 Implanted   GRAFT BNE PUTTY BIOACTV 15GM -- SIGNAFUSE - [id number]   GRAFT BNE PUTTY BIOACTV 15GM -- [id number] [employer] M[employer]ough Final [id number] N/A 1 Implanted

## 2018-02-20 NOTE — ANESTHESIA PREPROCEDURE EVALUATION
Anesthetic History   No history of anesthetic complications            Review of Systems / Medical History  Patient summary reviewed and pertinent labs reviewed    Pulmonary        Sleep apnea: No treatment           Neuro/Psych   Within defined limits           Cardiovascular    Hypertension              Exercise tolerance: >4 METS     GI/Hepatic/Renal     GERD           Endo/Other    Diabetes: type 2  Hypothyroidism  Arthritis     Other Findings   Comments:   Risk Factors for Postoperative nausea/vomiting:       History of postoperative nausea/vomiting? NO       Female? NO       Motion sickness? NO       Intended opioid administration for postoperative analgesia? YES      Smoking Abstinence  Current Smoker? NO  Elective Surgery? YES  Seen preoperatively by anesthesiologist or proxy prior to day of surgery? YES  Pt abstained from smoking 24 hours prior to anesthesia?  YES               Physical Exam    Airway  Mallampati: II  TM Distance: 4 - 6 cm  Neck ROM: normal range of motion   Mouth opening: Normal     Cardiovascular    Rhythm: regular  Rate: normal         Dental    Dentition: Poor dentition  Comments: Most teeth are ground down   Pulmonary  Breath sounds clear to auscultation               Abdominal  GI exam deferred       Other Findings            Anesthetic Plan    ASA: 3  Anesthesia type: general          Induction: Intravenous  Anesthetic plan and risks discussed with: Patient

## 2018-02-20 NOTE — H&P
H&P UPDATE  I have seen this patient and reviewed the H&P and there are no pertinent changes. In severe pain- Left LE and back.

## 2018-02-21 LAB
GLUCOSE BLD STRIP.AUTO-MCNC: 155 MG/DL (ref 70–110)
GLUCOSE BLD STRIP.AUTO-MCNC: 172 MG/DL (ref 70–110)
GLUCOSE BLD STRIP.AUTO-MCNC: 185 MG/DL (ref 70–110)
GLUCOSE BLD STRIP.AUTO-MCNC: 199 MG/DL (ref 70–110)
HBA1C MFR BLD: 7.3 % (ref 4.2–5.6)

## 2018-02-21 PROCEDURE — 97116 GAIT TRAINING THERAPY: CPT

## 2018-02-21 PROCEDURE — 74011250637 HC RX REV CODE- 250/637: Performed by: HOSPITALIST

## 2018-02-21 PROCEDURE — 74011250636 HC RX REV CODE- 250/636: Performed by: PHYSICIAN ASSISTANT

## 2018-02-21 PROCEDURE — 74011250637 HC RX REV CODE- 250/637: Performed by: PHYSICIAN ASSISTANT

## 2018-02-21 PROCEDURE — 97162 PT EVAL MOD COMPLEX 30 MIN: CPT

## 2018-02-21 PROCEDURE — 74011636637 HC RX REV CODE- 636/637: Performed by: HOSPITALIST

## 2018-02-21 PROCEDURE — 36415 COLL VENOUS BLD VENIPUNCTURE: CPT | Performed by: NEUROLOGICAL SURGERY

## 2018-02-21 PROCEDURE — 77030020255 HC SOL INJ LR 1000ML BG

## 2018-02-21 PROCEDURE — 83036 HEMOGLOBIN GLYCOSYLATED A1C: CPT | Performed by: NEUROLOGICAL SURGERY

## 2018-02-21 PROCEDURE — 65270000029 HC RM PRIVATE

## 2018-02-21 PROCEDURE — 82962 GLUCOSE BLOOD TEST: CPT

## 2018-02-21 RX ORDER — DEXTROSE 50 % IN WATER (D50W) INTRAVENOUS SYRINGE
25-50 AS NEEDED
Status: DISCONTINUED | OUTPATIENT
Start: 2018-02-21 | End: 2018-02-26 | Stop reason: HOSPADM

## 2018-02-21 RX ORDER — METHOCARBAMOL 750 MG/1
750 TABLET, FILM COATED ORAL
Status: DISCONTINUED | OUTPATIENT
Start: 2018-02-21 | End: 2018-02-26 | Stop reason: HOSPADM

## 2018-02-21 RX ORDER — INSULIN LISPRO 100 [IU]/ML
INJECTION, SOLUTION INTRAVENOUS; SUBCUTANEOUS
Status: DISCONTINUED | OUTPATIENT
Start: 2018-02-21 | End: 2018-02-21

## 2018-02-21 RX ORDER — BISACODYL 5 MG
10 TABLET, DELAYED RELEASE (ENTERIC COATED) ORAL DAILY PRN
Status: DISCONTINUED | OUTPATIENT
Start: 2018-02-21 | End: 2018-02-26 | Stop reason: HOSPADM

## 2018-02-21 RX ORDER — MAGNESIUM SULFATE 100 %
4 CRYSTALS MISCELLANEOUS AS NEEDED
Status: DISCONTINUED | OUTPATIENT
Start: 2018-02-21 | End: 2018-02-26 | Stop reason: HOSPADM

## 2018-02-21 RX ORDER — INSULIN LISPRO 100 [IU]/ML
INJECTION, SOLUTION INTRAVENOUS; SUBCUTANEOUS
Status: DISCONTINUED | OUTPATIENT
Start: 2018-02-21 | End: 2018-02-26 | Stop reason: HOSPADM

## 2018-02-21 RX ADMIN — HYDROCODONE BITARTRATE AND ACETAMINOPHEN 1 TABLET: 5; 325 TABLET ORAL at 13:53

## 2018-02-21 RX ADMIN — AMITRIPTYLINE HYDROCHLORIDE 10 MG: 10 TABLET, FILM COATED ORAL at 22:49

## 2018-02-21 RX ADMIN — ZOLPIDEM TARTRATE 5 MG: 5 TABLET ORAL at 22:49

## 2018-02-21 RX ADMIN — Medication 10 ML: at 18:20

## 2018-02-21 RX ADMIN — HYDROCODONE BITARTRATE AND ACETAMINOPHEN 1 TABLET: 5; 325 TABLET ORAL at 08:40

## 2018-02-21 RX ADMIN — INSULIN LISPRO 2 UNITS: 100 INJECTION, SOLUTION INTRAVENOUS; SUBCUTANEOUS at 17:12

## 2018-02-21 RX ADMIN — BISACODYL 10 MG: 5 TABLET, COATED ORAL at 18:17

## 2018-02-21 RX ADMIN — CARVEDILOL 3.12 MG: 3.12 TABLET, FILM COATED ORAL at 08:40

## 2018-02-21 RX ADMIN — SODIUM CHLORIDE, SODIUM LACTATE, POTASSIUM CHLORIDE, AND CALCIUM CHLORIDE 75 ML/HR: 600; 310; 30; 20 INJECTION, SOLUTION INTRAVENOUS at 04:09

## 2018-02-21 RX ADMIN — HYDROCODONE BITARTRATE AND ACETAMINOPHEN 1 TABLET: 5; 325 TABLET ORAL at 18:17

## 2018-02-21 RX ADMIN — CARVEDILOL 3.12 MG: 3.12 TABLET, FILM COATED ORAL at 17:13

## 2018-02-21 RX ADMIN — LEVOTHYROXINE SODIUM 125 MCG: 75 TABLET ORAL at 06:44

## 2018-02-21 RX ADMIN — TELMISARTAN 20 MG: 40 TABLET ORAL at 08:40

## 2018-02-21 RX ADMIN — INSULIN LISPRO 2 UNITS: 100 INJECTION, SOLUTION INTRAVENOUS; SUBCUTANEOUS at 08:42

## 2018-02-21 RX ADMIN — INSULIN LISPRO 2 UNITS: 100 INJECTION, SOLUTION INTRAVENOUS; SUBCUTANEOUS at 22:48

## 2018-02-21 NOTE — PROGRESS NOTES
Progress Note      Patient: Jb Nova               Sex: male          DOA: 2/20/2018         YOB: 1945      Age:  67 y.o.        LOS:  LOS: 1 day                  Procedure(s):  lumbar 4,5  TRANSFORAMINAL LUMBAR INTERBODY FUSION with pedicle screw placement , left iliac crest needle aspiration and exploration of hardware and fusion, hardware removal   1. Removal of L3-L4 hardware. 2.  Pedicle screw instrumentation, L3 to L5.  3.  Left L4-L5 transforaminal lumbar interbody fusion. 4.  Posterolateral fusion, L4-L5. 5.  Left iliac crest needle aspiration for the purpose of grafting. 6.  Fluoroscopy. 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  Herniation of nucleus pulposus  Adjacent segment disease with spinal stenosis        Past Medical History:   Diagnosis Date    Acute tonsillitis     Back pain     Community acquired pneumonia     Degenerative disc disease     Diverticulosis     DM type 2 (diabetes mellitus, type 2) ([facility] [address]     Dyslipidemia     Frequency of urination     GERD (gastroesophageal reflux disease)     H/O joint replacement     Hearing reduced     Hypercholesteremia     Hyperlipidemia     Hypertension     Hypothyroidism     Kidney stones     Neuropathy     Nocturia     Radiculopathy     Sleep apnea     Does not use CPAP. Never had sleep study    Thyroid disease     Varicella     Vision decreased        Past Surgical History:   Procedure Laterality Date    HX CHOLECYSTECTOMY      HX HERNIA REPAIR       x 3    HX LUMBAR DISKECTOMY  2007    x2    HX LUMBAR FUSION      HX OTHER SURGICAL      ORTHOPEDIC SURGERY    TOTAL HIP ARTHROPLASTY      Both hips       POD#   SUBJECTIVE:  Doing well. Some expected numbness at surgical site. Denies numbness, tingling, burning, and radiation to BLE.  + flatus.       OBJECTIVE:  Patient Vitals for the past 24 hrs:   Temp Pulse Resp BP SpO2   02/21/18 0805 97.5 °F (36.4 °C) 98 19 (!) 143/95 95 %   02/21/18 0404 98 °F (36.7 °C) 96 18 147/87 93 %   02/20/18 2347 98.1 °F (36.7 °C) 99 18 145/87 92 %   02/20/18 1755 98.2 °F (36.8 °C) 100 18 (!) 149/110 97 %   02/20/18 1739 - 96 10 - 96 %   02/20/18 1726 - 96 14 150/86 94 %   02/20/18 1711 - (!) 102 21 140/70 96 %   02/20/18 1656 - (!) 103 20 (!) 162/94 96 %   02/20/18 1645 - (!) 103 19 155/85 (!) 87 %   02/20/18 1636 - 99 10 (!) 161/100 100 %   02/20/18 1630 - 98 14 (!) 157/98 100 %   02/20/18 1627 98.4 °F (36.9 °C) (!) 107 10 (!) 135/98 100 %   02/20/18 1626 - 99 14 (!) 152/97 100 %   02/20/18 1620 - (!) 103 17 152/87 100 %   02/20/18 1617 - (!) 104 19 (!) 152/96 99 %   02/20/18 1615 - (!) 105 16 (!) 162/148 99 %   02/20/18 1611 - (!) 105 14 156/86 99 %   02/20/18 1608 - (!) 106 8 - 100 %   02/20/18 1605 - - - (!) 135/98 -   02/20/18 1027 96.9 °F (36.1 °C) 79 21 (!) 136/93 97 %           Vent         Intake and Output    Intake/Output Summary (Last 24 hours) at 02/21/18 1011  Last data filed at 02/21/18 0810   Gross per 24 hour   Intake           2662.5 ml   Output             1725 ml   Net            937.5 ml         Data    Recent Results (from the past 24 hour(s))   GLUCOSE, POC    Collection Time: 02/20/18 10:20 AM   Result Value Ref Range    Glucose (POC) 128 (H) 70 - 110 mg/dL   GLUCOSE, POC    Collection Time: 02/20/18  4:10 PM   Result Value Ref Range    Glucose (POC) 118 (H) 70 - 110 mg/dL   GLUCOSE, POC    Collection Time: 02/20/18  9:06 PM   Result Value Ref Range    Glucose (POC) 194 (H) 70 - 110 mg/dL   GLUCOSE, POC    Collection Time: 02/21/18  6:42 AM   Result Value Ref Range    Glucose (POC) 199 (H) 70 - 110 mg/dL        Current Facility-Administered Medications   Medication Dose Route Frequency    glucose chewable tablet 16 g  4 Tab Oral PRN    glucagon (GLUCAGEN) injection 1 mg  1 mg IntraMUSCular PRN    dextrose (D50W) injection syrg 12.5-25 g  25-50 mL IntraVENous PRN    insulin lispro (HUMALOG) injection SubCUTAneous AC&HS    sodium chloride (NS) flush 5-10 mL  5-10 mL IntraVENous Q8H    sodium chloride (NS) flush 5-10 mL  5-10 mL IntraVENous PRN    lactated Ringers infusion  75 mL/hr IntraVENous CONTINUOUS    acetaminophen (TYLENOL) tablet 650 mg  650 mg Oral Q4H PRN    HYDROcodone-acetaminophen (NORCO) 5-325 mg per tablet 1 Tab  1 Tab Oral Q4H PRN    naloxone (NARCAN) injection 0.4 mg  0.4 mg IntraVENous PRN    ondansetron (ZOFRAN) injection 4 mg  4 mg IntraVENous Q4H PRN    phenol throat spray (CHLORASEPTIC) 1 Spray  1 Spray Oral PRN    benzocaine-menthol (CEPACOL) lozenge 1 Lozenge  1 Lozenge Oral PRN    diphenhydrAMINE (BENADRYL) injection 12.5 mg  12.5 mg IntraVENous Q4H PRN    diphenhydrAMINE (BENADRYL) capsule 25 mg  25 mg Oral Q4H PRN    magnesium hydroxide (MILK OF MAGNESIA) 400 mg/5 mL oral suspension 30 mL  30 mL Oral DAILY PRN    zolpidem (AMBIEN) tablet 5 mg  5 mg Oral QHS PRN    amitriptyline (ELAVIL) tablet 10 mg  10 mg Oral QHS    carvedilol (COREG) tablet 3.125 mg  3.125 mg Oral BID WITH MEALS    levothyroxine (SYNTHROID) tablet 125 mcg  125 mcg Oral ACB    telmisartan (MICARDIS) tablet 20 mg  20 mg Oral DAILY    HYDROmorphone (DILAUDID) injection 1 mg  1 mg IntraVENous Q4H PRN       Physical Exam  General:  Neurologic: Alert and oriented X 3  Sensory: normal to legs. Eyes: conjunctivae/corneas clear. PERRL, EOM's intact. Motor: Iliopsoas 5/5, Quads 5/5, Anterior Tibial 5/5, Gastrocnemius Medial 5/5, and Extensor Hallucis Longus 5/5  Jun negative. Denies tenderness to deep palpitation to lateral and medial thighs and calves. Skin: Incision to lower back with prineo intact, edges well approximated, no erythema, no edema, no drainage, no ecchymosis, and no hematoma. hemovac to bulb suction. Draining moderate amounts of sero-sang drainage. Assessment/Plan    67y.o. year old male who is hospital day 1 for Herniation of nucleus pulposus.      1.)  Neurologic:  Doing well.  Ice pack PRN for pain along with PO analgesics and antispasmodics. Brace when OOB. Mobilize. PT. Hemovac to remain. 2.)  Cardiovascular:  Maintain SBP < 140.  3.)  Pulmonary:  Pulmonary toilet.       Assessment and plan made with Dr. Galo Casillas, NP  2/21/2018  10:11 AM

## 2018-02-21 NOTE — PROGRESS NOTES
Patient has designated _wife__ to participate in his/her discharge plan and to receive any needed information.      Name: Jennifer Rosenda  Address:  Phone number: [phone]

## 2018-02-21 NOTE — PROGRESS NOTES
[facility]   Discharge Planning/ Assessment    Reasons for Intervention: Initial discharge planning interview. Face sheet data reviewed with pt. All information confirmed as correct. Pt lives with his wife and designates her as his emergency contact and consents to sharing discharge information with her. PTA pt states he ambulated independently with a cane and was able to perform adls independently. Other DME at home incl: walker. Pt states he's had [address] for home PT in the past and would like to continue with them if he needs home health PT/OT. Pt with Medicare A&B primary and  for [facility] secondary. PCP is Dr. Jeimy Terrazas. Pt does not want rehab. Anticipated discharge plan is home with home health.       High Risk Criteria  [] Yes  [x]No   Physician Referral  [] Yes  []No        Date    Nursing Referral  [] Yes  []No        Date    Patient/Family Request  [] Yes  []No        Date       Resources:    Medicare  [x] Yes  []No   Medicaid  [] Yes  []No   No Resources  [] Yes  []No   Private Insurance  [x] Yes  []No    Name/Phone Number    Other  [] Yes  []No        (i.e. Workman's Comp)         Prior Services:    Prior Services  [x] Yes  []No   Home Health  [x] Yes  []No   [address]  [] Yes  []No        Number of [address]  [] Yes  []No       Meals on Wheels  [] Yes  []No   Office on Aging  [] Yes  []No   Transportation Services  [] Yes  []No   Nursing Home  [] Yes  []No        Nursing Home Name    [address]  [] Yes  []No        [address] Name    Other       Information Source:      Information obtained from  [x] Patient  [] Parent   [] [address]  [] Child  [] Spouse   [] Significant Other/Partner   [] Friend      [] EMS    [] Nursing Home Chart          [] Other:   Chart Review  [x] Yes  []No     Family/Support System:    Patient lives with  [] Alone    [x] Spouse   [] Significant Other  [] Children  [] Caretaker   [] Parent  [] Sibling     [] Other       Other Support System:    Is the patient responsible for care of others  [] Yes  [x]No   Information of person caring for patient on  discharge    Managers financial affairs independently  [x] Yes  []No   If no, explain:      Status Prior to Admission:    Mental Status  [x] Awake  [x] Alert  [x] Oriented  [] Quiet/Calm [] Lethargic/Sedated   [] Disoriented  [] Restless/Anxious  [] Combative   Personal Care  [] Dependent  [x] [address]  [] Requires Assistance   Meal Preparation Ability  [x] Independent   [] Standby Assistance   [] Minimal Assistance   [] Moderate Assistance  [] Maximum Assistance     [] Total Assistance   Chores  [x] Independent with Chores   [] [address] Resident   [] Requires Assistance   Bowel/Bladder  [] Continent  [] Catheter  [] Incontinent  [] Ostomy Self-Care    [] Urine Diversion Self-Care  [] Maximum Assistance     [] Total Assistance   Number of Persons needed for assistance    DME at home  [] Caprice Fragmin, Remberto Locust  [x] Caprice Fragmin, Straight   [] Commode    [] Bathroom/Grab Bars  [] Hospital Bed  [] Nebulizer  [] Oxygen           [] Raised Toilet Seat  [] Shower Chair  [] Side Rails for Bed   [] Tub Transfer Bench   [x] Loren Crump  [] Mitchel Davis      [] Other:   Vendor      Treatment Presently Receiving:    Current Treatments  [] Chemotherapy  [] Dialysis  [] Insulin  [] IVAB [] IVF   [] O2  [] PCA   [] PT   [] RT   [] Tube Feedings   [] Wound Care     Psychosocial Evaluation:    Verbalized Knowledge of Disease Process  [] Patient  []Family   Coping with Disease Process  [] Patient  []Family   Requires Further Counseling Coping with Disease Process  [] Patient  []Family     Identified Projected Needs:    Home Health Aid  [] Yes  []No   Transportation  [] Yes  []No   Education  [] Yes  []No        Specific Education     Financial Counseling  [] Yes  []No   Inability to Care for Self/Will Require 24 hour care  [] Yes  []No Pain Management  [] Yes  []No   Home Infusion Therapy  [] Yes  []No   Oxygen Therapy  [] Yes  []No   DME  [] Yes  []No   Long Term Care Placement  [] Yes  []No   Rehab  [] Yes  []No   Physical Therapy  [x] Yes  []No   Needs Anticipated At This Time  [x] Yes  []No     Intra-Hospital Referral:    [facility]  [] Yes  []No     [] Yes  []No   Patient Representative  [] Yes  []No   Staff for Teaching Needs  [] Yes  []No   Specialty Teaching Needs     Diabetic Educator  [] Yes  []No   Referral for Diabetic Educator Needed  [] Yes  []No  If Yes, place order for Nutritionist or Diabetic Consult     Tentative Discharge Plan:    Home with No Services  [] Yes  []No   Home with Home Health Follow-up  [x] Yes  []No        If Yes, specify type    Home Care Program  [] Yes  []No        If Yes, specify type    Meals on Wheels  [] Yes  []No   Office of Aging  [] Yes  []No   NHP  [] Yes  []No   Return to the [facility]  [] Yes  []No   Rehab Therapy  [] Yes  []No   Acute Rehab  [] Yes  []No   Subacute Rehab  [] Yes  []No   Private Care  [] Yes  []No   Substance Abuse Referral  [] Yes  []No   Transportation  [] Yes  []No   Chore Service  [] Yes  []No   Inpatient Hospice  [] Yes  []No   OP RT  [] Yes  [] No   OP Hemo  [] Yes  [] No   OP PT  [] Yes  []No   Support Group  [] Yes  []No   Reach to Recovery  [] Yes  []No   OP Oncology Clinic  [] Yes  []No   Clinic Appointment  [] Yes  []No   DME  [] Yes  []No   Comments    Name of D/C Planner or  Given to Patient or [facility], REINIER Simons [address] Management  Ph: [phone]  Pager: [phone]   Phone Number         Extension    Date 2/21/18   Time    If you are discharged home, whom do you designate to participate in your discharge plan and receive any information needed?      Enter name of Nilay Peraza- wife    Phone number: [phone]        Phone # of designee         Address of designee         Updated         Patient refused to designate any           individual

## 2018-02-21 NOTE — PROGRESS NOTES
Problem: Discharge Planning  Goal: *Discharge to safe environment  Outcome: Progressing Towards Goal  Home with home health

## 2018-02-21 NOTE — PROGRESS NOTES
Problem: Falls - Risk of  Goal: *Absence of Falls  Document Mai Fall Risk and appropriate interventions in the flowsheet.    Outcome: Progressing Towards Goal  Fall Risk Interventions:  Mobility Interventions: Utilize walker, cane, or other assitive device     Medication Interventions: Patient to call before getting OOB, Teach patient to arise slowly    Problem: Complex Spine Procedure:  Post Op Day 1  Goal: Activity/Safety  Outcome: Progressing Towards Goal  Pt ambulated halls with PT  Goal: Nutrition/Diet  Outcome: Progressing Towards Goal  Pt tolerating diet and denies nausea  Goal: Treatments/Interventions/Procedures  Outcome: Progressing Towards Goal  Patient Vitals for the past 8 hrs:   Temp Pulse Resp BP SpO2   02/21/18 1355 97.8 °F (36.6 °C) 99 21 (!) 147/97 94 %   02/21/18 1048 98.1 °F (36.7 °C) 100 20 (!) 144/109 95 %   02/21/18 0805 97.5 °F (36.4 °C) 98 19 (!) 143/95 95 %         Goal: *Optimal pain control at patient's stated goal  Outcome: Progressing Towards Goal  Pt reports pain well controlled  Goal: *Resumes normal function of bladder and bowel  Outcome: Progressing Towards Goal  Pt voiding without difficulty

## 2018-02-21 NOTE — PROGRESS NOTES
Shift Progress Note:  Assumed care of patient in bed awake with wife @ bedside. VSS, uneventful night dressing dry & Intact. Medicated x1 for pain, voiding without difficulty in urinal, uneventful night.   Patient Vitals for the past 12 hrs:   Temp Pulse Resp BP SpO2   02/21/18 0404 98 °F (36.7 °C) 96 18 147/87 93 %   02/20/18 2347 98.1 °F (36.7 °C) 99 18 145/87 92 %

## 2018-02-21 NOTE — PROGRESS NOTES
conducted an initial consultation and Spiritual Assessment for Abdifatah Saleem, who is a 67 y. o.,male. Patients Primary Language is: [place]. According to the patients EMR Taoism Affiliation is: Baptist. The reason the Patient came to the hospital is:   Patient Active Problem List    Diagnosis Date Noted    Herniation of nucleus pulposus 02/20/2018    Adjacent segment disease with spinal stenosis 02/20/2018    Thyroid disease 02/20/2018    Sleep apnea 02/20/2018    Hyperlipidemia 02/20/2018    Hypertension 02/20/2018    GERD (gastroesophageal reflux disease) 02/20/2018    DM type [facility] ([facility] mellitus[facility] type [facility]) [facility] 02/20/2018    Scoliosis deformity of spine 05/30/2017    Lumbar disc herniation with radiculopathy 05/30/2017    Elevated PSA 03/13/2012    Urinary incontinence 03/12/2012        The  provided the following Interventions:  Initiated a relationship of care and support with patient in room 2203 this morning. .  Listened empathically as he talked about being here before and came back this time to have more surgery done   Patient in good spirits it seems and feeling OK at present. Provided information about Spiritual Care Services. Offered prayer and assurance of continued prayers on patients behalf. The following outcomes were achieved:  Patient shared limited information about his medical narrative and spiritual journey. Patient processed feeling about current hospitalization. Assessment:  Patient does not have any Jain/cultural needs that will affect patients preferences in health care. There are no further spiritual or Jain issues which require Spiritual Care Services interventions at this time. Plan:  Chaplains will continue to follow and will provide pastoral care on an as needed/requested basis    . Radha Lamb   Spiritual Care   [phone]

## 2018-02-21 NOTE — PROGRESS NOTES
Certified Rivas Reynaga provider rounded on Melissa Gomes to provide education related to sleep apnea after chart review for risk factors. Risk factors include:  1. Hypertension  2. Diabetes, Type II  3. GERD  4. Mallampati II    Provided patient with the following pamphlets:  1. What is Sleep Apnea  2. Sleep and Medical problems  3. Sleep & Your Heart  4. Common Sleep Problems for Older Adults  5. Tips For Sleep As You Age  10. Tips For Better Sleep In Older Adults    Patient Education:  1. Reviewed sleep hygiene & effects of poor sleep quality. 2. Reviewed relationship between sleep & heart health. 3. Reviewed relationship between sleep & age. 4. Reviewed relationship between sleep & weight. Patient has been seen by provider on 5/31/17. He was schedule to have a sleep study prior to first visit, but ended up having back surgery. On this visit, patient explained that he had a sleep study scheduled, but needed to have back surgery again. Recommendations:  1. Referral to sleep specialist for evaluation if symptoms of poor sleep quality present. 2. Order baseline PSG testing to be arranged as an outpatient. 3. Follow up with sleep specialist for treatment and management.

## 2018-02-21 NOTE — PROGRESS NOTES
Internal Medicine Progress Note    Patient's Name: Stephanie Ortega  Admit Date: 2/20/2018  Length of Stay: 1      Assessment/Plan     Active Hospital Problems    Diagnosis Date Noted    Herniation of nucleus pulposus 02/20/2018    Adjacent segment disease with spinal stenosis 02/20/2018    Thyroid disease 02/20/2018    Sleep apnea 02/20/2018     Does not use CPAP. Never had sleep study      Hyperlipidemia 02/20/2018    Hypertension 02/20/2018    GERD (gastroesophageal reflux disease) 02/20/2018    DM type 2 (diabetes mellitus, type 2) ([facility] [address] 02/20/2018     - Diet and mobilization per primary team  - PT/OT  - Pain control PRN  - BP slightly elevated this morning, cont to monitor clinically for now  - F/u neurosurg  - SSI and accuchecks  - Cont acceptable home medications for chronic conditions   - DVT protocol    I have personally reviewed all pertinent labs and films that have officially resulted over the last 24 hours. I have personally checked for all pending labs that are awaiting final results.     Subjective     Pt s/e @ bedside  No major events overnight  Pt admits pain is much better than before  Denies CP or SOB    Objective     Visit Vitals    BP (!) 144/109 (BP 1 Location: Left arm, BP Patient Position: At rest)    Pulse 100    Temp 98.1 °F (36.7 °C)    Resp 20    Ht 5' 10\" (1.778 m)    Wt 87.5 kg (193 lb)    SpO2 95%    BMI 27.69 kg/m2       Physical Exam:  General Appearance: NAD, conversant  Lungs: CTA with normal respiratory effort  CV: RRR, no m/r/g  Abdomen: soft, non-tender, normal bowel sounds  Extremities: no cyanosis, no peripheral edema  Neuro: No focal deficits, motor/sensory relatively intact w/ only mild LLE weakness    Lab/Data Reviewed:  BMP: No results found for: NA, K, CL, CO2, AGAP, GLU, BUN, CREA, GFRAA, GFRNA  CBC: No results found for: WBC, HGB, HGBEXT, HCT, HCTEXT, PLT, PLTEXT, HGBEXT, HCTEXT, PLTEXT    Imaging Reviewed:  Xr Spine Lumb 2 Or 3 V    Result Date: 2/21/2018  EXAM: Lumbar spine in the OR INDICATION: L4-5 HNP adjacent segment disease COMPARISON: None. _______________ FINDINGS: AP and lateral small field-of-view spot images including the mid to lower lumbar spine and upper sacrum were submitted to PACS. Assuming typical segmentation, L3-L5 bilateral pedicle screws are in place with vertical interconnecting rods and an anterior interbody spacer was placed at L4-5. Although not well visualized, there appears to be an interbody spacer at L3-4, probably with bony incorporation. No fracture or subluxation. Radiation dose (reference air kerma): 9.62 mGy _______________     IMPRESSION: L4-5 circumferential fusion with more remote L3-4 circumferential fusion in place. No fracture or subluxation. Nc Xr Technologist Service    Result Date: 2/21/2018  Fluoroscopy was used during surgery for this procedure under the supervision of the attending surgeon. Start Time:     1330 hours End Time:      1600 hours # of Images:  0       IMPRESSION:   Administrative report.   KL      Medications Reviewed:  Current Facility-Administered Medications   Medication Dose Route Frequency    glucose chewable tablet 16 g  4 Tab Oral PRN    glucagon (GLUCAGEN) injection 1 mg  1 mg IntraMUSCular PRN    dextrose (D50W) injection syrg 12.5-25 g  25-50 mL IntraVENous PRN    insulin lispro (HUMALOG) injection   SubCUTAneous AC&HS    methocarbamol (ROBAXIN) tablet 750 mg  750 mg Oral QID PRN    sodium chloride (NS) flush 5-10 mL  5-10 mL IntraVENous Q8H    sodium chloride (NS) flush 5-10 mL  5-10 mL IntraVENous PRN    acetaminophen (TYLENOL) tablet 650 mg  650 mg Oral Q4H PRN    HYDROcodone-acetaminophen (NORCO) 5-325 mg per tablet 1 Tab  1 Tab Oral Q4H PRN    naloxone (NARCAN) injection 0.4 mg  0.4 mg IntraVENous PRN    ondansetron (ZOFRAN) injection 4 mg  4 mg IntraVENous Q4H PRN    phenol throat spray (CHLORASEPTIC) 1 Spray  1 Spray Oral PRN    benzocaine-menthol (CEPACOL) lozenge 1 Lozenge  1 Lozenge Oral PRN    diphenhydrAMINE (BENADRYL) injection 12.5 mg  12.5 mg IntraVENous Q4H PRN    diphenhydrAMINE (BENADRYL) capsule 25 mg  25 mg Oral Q4H PRN    magnesium hydroxide (MILK OF MAGNESIA) 400 mg/5 mL oral suspension 30 mL  30 mL Oral DAILY PRN    zolpidem (AMBIEN) tablet 5 mg  5 mg Oral QHS PRN    amitriptyline (ELAVIL) tablet 10 mg  10 mg Oral QHS    carvedilol (COREG) tablet 3.125 mg  3.125 mg Oral BID WITH MEALS    levothyroxine (SYNTHROID) tablet 125 mcg  125 mcg Oral ACB    telmisartan (MICARDIS) tablet 20 mg  20 mg Oral DAILY    HYDROmorphone (DILAUDID) injection 1 mg  1 mg IntraVENous Q4H PRN           Jasmyne Zamudio, DO  Internal Medicine, Hospitalist  Pager: [phone]  [address] [place] [address]

## 2018-02-21 NOTE — PROGRESS NOTES
Problem: Mobility Impaired (Adult and Pediatric)  Goal: *Acute Goals and Plan of Care (Insert Text)  Physical Therapy Goals  Initiated 2/21/2018 and to be accomplished within 7 day(s)  1. Patient will ambulate with modified independence for 500 feet with the least restrictive device. 2.  Patient will ascend/descend 10 stairs with handrail(s) with modified independence. Outcome: Progressing Towards Goal  physical Therapy EVALUATION    Patient: Red Coffey (25 y.o. male)  Date: 2/21/2018  Primary Diagnosis: l4-5 hnp adjacent segment disease  m96.3  Herniation of nucleus pulposus  Adjacent segment disease with spinal stenosis  Procedure(s) (LRB):  lumbar 4,5  TRANSFORAMINAL LUMBAR INTERBODY FUSION with pedicle screw placement , left iliac crest needle aspiration and exploration of hardware and fusion, hardware removal (N/A) 1 Day Post-Op   Precautions: Back    ASSESSMENT :  Patient requires modified independence for bed mobility, transfers and ambulation. Educated on lumbar precautions; verbalized at end of session. Mod I for bed mobility. Good seated balance at EOB. Donned brace with min A. Mod I for sit to stand. Amb into restroom with steady gait. Complete voiding in standing independently. Amb 330 ft mod I with ww. Steady gait. Returned to seated in chair. Good control with descent from stand to sit. Call martínez in reach. RN Tmoi aware. Educated on role of PT, plan of care, and need for RN assistance prior to mobility. Will follow to trial stairs. Patient presents with deficits in:  Bed Mobility, Transfers, Gait, Strength, Balance and Stairs    Patient will benefit from skilled intervention to address the above impairments.   Patients rehabilitation potential is considered to be Good  Factors which may influence rehabilitation potential include:   []         None noted  []         Mental ability/status  [x]         Medical condition  []         Home/family situation and support systems  []         Safety awareness  []         Pain tolerance/management  []         Other:      PLAN :  Recommendations and Planned Interventions:  [x]           Bed Mobility Training             [x]    Neuromuscular Re-Education  [x]           Transfer Training                   []    Orthotic/Prosthetic Training  [x]           Gait Training                          []    Modalities  [x]           Therapeutic Exercises          []    Edema Management/Control  [x]           Therapeutic Activities            [x]    Patient and Family Training/Education  []           Other (comment):    Frequency/Duration: Patient will be followed by physical therapy 1-2 times per day/4-7 days per week to address goals. Discharge Recommendations: None  Further Equipment Recommendations for Discharge: rolling walker; has     SUBJECTIVE:   Patient stated I thought you'd be here sooner.     OBJECTIVE DATA SUMMARY:     Past Medical History:   Diagnosis Date    Acute tonsillitis     Back pain     Community acquired pneumonia     Degenerative disc disease     Diverticulosis     DM type 2 [facility]diabetes mellitus, type 2) ([facility] [address]     Dyslipidemia     Frequency of urination     GERD (gastroesophageal reflux disease)     H/O joint replacement     Hearing reduced     Hypercholesteremia     Hyperlipidemia     Hypertension     Hypothyroidism     Kidney stones     Neuropathy     Nocturia     Radiculopathy     Sleep apnea     Does not use CPAP. Never had sleep study    Thyroid disease     Varicella     Vision decreased      Past Surgical History:   Procedure Laterality Date    HX CHOLECYSTECTOMY      HX HERNIA REPAIR       x 3    HX LUMBAR DISKECTOMY  2007    x2    HX LUMBAR FUSION      HX OTHER SURGICAL      ORTHOPEDIC SURGERY    TOTAL HIP ARTHROPLASTY      Both hips     Barriers to Learning/Limitations: None  Compensate with: visual, verbal, tactile, kinesthetic cues/model    G CODES:Mobility  Current  CJ= 20-39%   Goal  CI= 1-19%.   The severity rating is based on the [employer] Balance Scale 3+/5    Eval Complexity: History: MEDIUM  Complexity : 1-2 comorbidities / personal factors will impact the outcome/ POC Exam:MEDIUM Complexity : 3 Standardized tests and measures addressing body structure, function, activity limitation and / or participation in recreation  Presentation: MEDIUM Complexity : Evolving with changing characteristics  Clinical Decision Making:Medium Complexity [facility] Standing Balance Scale 3+/5 Overall Complexity:MEDIUM    [employer] Balance Scale 3+/5  0: Pt performs 25% or less of standing activity (Max assist) CN, 100% impaired. 1: Pt supports self with upper extremities but requires therapist assistance. Pt performs 25-50% of effort (Mod assist) CM, 80% to <100% impaired. 1+: Pt supports self with upper extremities but requires therapist assistance. Pt performs >50% effort. (Min assist). CL, 60% to <80% impaired. 2: Pt supports self independently with both upper extremities (walker, crutches, parallel bars). CL, 60% to <80% impaired. 2+: Pt support self independently with 1 upper extremity (cane, crutch, 1 parallel bar). CK, 40% to <60% impaired. 3: Pt stands without upper extremity support for up to 30 seconds. CK, 40% to <60% impaired. 3+: Pt stands without upper extremity support for 30 seconds or greater. CJ, 20% to <40% impaired. 4: Pt independently moves and returns center of gravity 1-2 inches in one plane. CJ, 20% to <40% impaired. 4+: Pt independently moves and returns center of gravity 1-2 inches in multiple planes. CI, 1% to <20% impaired. 5: Pt independently moves and returns center of gravity in all planes greater than 2 inches. CH, 0% impaired.     Prior Level of Function/Home Situation: Independent  Home Situation  Home Environment: Private residence  # Steps to Enter: 4  Rails to Enter: Yes  One/Two Story Residence: Two story  Living Alone: No  Support Systems: Spouse/Significant Other/Partner  Patient Expects to be Discharged to[de-identified] Private residence  Current DME Used/Available at Home: Jeet Dose, straight, Walker, rolling  Critical Behavior:  Neurologic State: Alert  Orientation Level: Oriented X4  Cognition: Appropriate decision making; Follows commands  Safety/Judgement: Awareness of environment; Fall prevention  Psychosocial  Patient Behaviors: Cooperative  Strength:    Strength: Within functional limits  Tone & Sensation:   Tone: Normal  Sensation: Intact  Range Of Motion:  AROM: Within functional limits  Functional Mobility:  Bed Mobility:  Rolling: Modified independent  Supine to Sit: Modified independent  Sit to Supine:  (seated in recliner)  Transfers:  Sit to Stand: Modified independent  Stand to Sit: Modified independent  Balance:   Sitting: Intact  Standing: Intact; With support  Ambulation/Gait Training:  Distance (ft): 330 Feet (ft)  Assistive Device: Walker, rolling  Ambulation - Level of Assistance: Modified independent  Gait Description (WDL): Exceptions to WDL  Pain:  Pre treatment pain level: 2  Post treatment pain level: 4  Pain Scale 1: Numeric (0 - 10)  Pain Intensity 1: 4  Pain Location 1: Back  Pain Orientation 1: Posterior  Pain Description 1: Aching  Pain Intervention(s) 1: Medication (see MAR)  Activity Tolerance:   Good  Please refer to the flowsheet for vital signs taken during this treatment. After treatment:   [x]         Patient left in no apparent distress sitting up in chair  []         Patient left in no apparent distress in bed  [x]         Call bell left within reach  [x]         Nursing notified  []         Caregiver present  []         Bed alarm activated    COMMUNICATION/EDUCATION:   [x]         Fall prevention education was provided and the patient/caregiver indicated understanding. [x]         Patient/family have participated as able in goal setting and plan of care.   [x]         Patient/family agree to work toward stated goals and plan of care. []         Patient understands intent and goals of therapy, but is neutral about his/her participation. []         Patient is unable to participate in goal setting and plan of care. Patient educated on the role of physical therapy during the acute stay  and the importance of mobility. ATUL.       Thank you for this referral.  Shantelle Tam, PT   Time Calculation: 26 mins

## 2018-02-21 NOTE — ROUTINE PROCESS
Bedside and Verbal shift change report given to [employer] (oncoming nurse) by Sobeida Escobedo RN   (offgoing nurse). Report included the following information SBAR, ED Summary, MAR and Recent Results.

## 2018-02-22 LAB
GLUCOSE BLD STRIP.AUTO-MCNC: 129 MG/DL (ref 70–110)
GLUCOSE BLD STRIP.AUTO-MCNC: 162 MG/DL (ref 70–110)

## 2018-02-22 PROCEDURE — 74011250637 HC RX REV CODE- 250/637: Performed by: NURSE PRACTITIONER

## 2018-02-22 PROCEDURE — 74011250636 HC RX REV CODE- 250/636: Performed by: NEUROLOGICAL SURGERY

## 2018-02-22 PROCEDURE — 74011250637 HC RX REV CODE- 250/637: Performed by: PHYSICIAN ASSISTANT

## 2018-02-22 PROCEDURE — 74011636637 HC RX REV CODE- 636/637: Performed by: HOSPITALIST

## 2018-02-22 PROCEDURE — 74011250637 HC RX REV CODE- 250/637: Performed by: HOSPITALIST

## 2018-02-22 PROCEDURE — 65270000029 HC RM PRIVATE

## 2018-02-22 PROCEDURE — 82962 GLUCOSE BLOOD TEST: CPT

## 2018-02-22 RX ORDER — ADHESIVE BANDAGE
30 BANDAGE TOPICAL ONCE
Status: COMPLETED | OUTPATIENT
Start: 2018-02-22 | End: 2018-02-22

## 2018-02-22 RX ORDER — FACIAL-BODY WIPES
10 EACH TOPICAL DAILY PRN
Status: DISCONTINUED | OUTPATIENT
Start: 2018-02-22 | End: 2018-02-26 | Stop reason: HOSPADM

## 2018-02-22 RX ORDER — HYDROCODONE BITARTRATE AND ACETAMINOPHEN 5; 325 MG/1; MG/1
1-2 TABLET ORAL
Status: DISCONTINUED | OUTPATIENT
Start: 2018-02-22 | End: 2018-02-24

## 2018-02-22 RX ORDER — DOCUSATE SODIUM 100 MG/1
100 CAPSULE, LIQUID FILLED ORAL DAILY
Status: DISCONTINUED | OUTPATIENT
Start: 2018-02-22 | End: 2018-02-26 | Stop reason: HOSPADM

## 2018-02-22 RX ORDER — POLYETHYLENE GLYCOL 3350 17 G/17G
17 POWDER, FOR SOLUTION ORAL DAILY
Status: DISCONTINUED | OUTPATIENT
Start: 2018-02-22 | End: 2018-02-26 | Stop reason: HOSPADM

## 2018-02-22 RX ADMIN — CARVEDILOL 3.12 MG: 3.12 TABLET, FILM COATED ORAL at 17:20

## 2018-02-22 RX ADMIN — ZOLPIDEM TARTRATE 5 MG: 5 TABLET ORAL at 21:35

## 2018-02-22 RX ADMIN — POLYETHYLENE GLYCOL (3350) 17 G: 17 POWDER, FOR SOLUTION ORAL at 09:20

## 2018-02-22 RX ADMIN — CARVEDILOL 3.12 MG: 3.12 TABLET, FILM COATED ORAL at 09:20

## 2018-02-22 RX ADMIN — DOCUSATE SODIUM 100 MG: 100 CAPSULE, LIQUID FILLED ORAL at 09:44

## 2018-02-22 RX ADMIN — MAGNESIUM HYDROXIDE 30 ML: 400 SUSPENSION ORAL at 10:30

## 2018-02-22 RX ADMIN — HYDROMORPHONE HYDROCHLORIDE 1 MG: 2 INJECTION INTRAMUSCULAR; INTRAVENOUS; SUBCUTANEOUS at 21:35

## 2018-02-22 RX ADMIN — Medication 10 ML: at 21:38

## 2018-02-22 RX ADMIN — HYDROCODONE BITARTRATE AND ACETAMINOPHEN 2 TABLET: 5; 325 TABLET ORAL at 11:53

## 2018-02-22 RX ADMIN — HYDROMORPHONE HYDROCHLORIDE 1 MG: 2 INJECTION INTRAMUSCULAR; INTRAVENOUS; SUBCUTANEOUS at 14:57

## 2018-02-22 RX ADMIN — LEVOTHYROXINE SODIUM 125 MCG: 75 TABLET ORAL at 06:41

## 2018-02-22 RX ADMIN — HYDROCODONE BITARTRATE AND ACETAMINOPHEN 2 TABLET: 5; 325 TABLET ORAL at 18:16

## 2018-02-22 RX ADMIN — HYDROMORPHONE HYDROCHLORIDE 1 MG: 2 INJECTION INTRAMUSCULAR; INTRAVENOUS; SUBCUTANEOUS at 10:46

## 2018-02-22 RX ADMIN — Medication 10 ML: at 14:10

## 2018-02-22 RX ADMIN — AMITRIPTYLINE HYDROCHLORIDE 10 MG: 10 TABLET, FILM COATED ORAL at 21:35

## 2018-02-22 RX ADMIN — HYDROCODONE BITARTRATE AND ACETAMINOPHEN 1 TABLET: 5; 325 TABLET ORAL at 02:19

## 2018-02-22 RX ADMIN — TELMISARTAN 20 MG: 40 TABLET ORAL at 09:20

## 2018-02-22 RX ADMIN — HYDROCODONE BITARTRATE AND ACETAMINOPHEN 1 TABLET: 5; 325 TABLET ORAL at 06:41

## 2018-02-22 RX ADMIN — INSULIN LISPRO 2 UNITS: 100 INJECTION, SOLUTION INTRAVENOUS; SUBCUTANEOUS at 11:40

## 2018-02-22 NOTE — ROUTINE PROCESS
Bedside and Verbal shift change report given to [address] (oncoming nurse) by Mel Merino RN   (offgoing nurse). Report included the following information SBAR, Kardex, MAR and Recent Results.

## 2018-02-22 NOTE — ROUTINE PROCESS
Bedside and Verbal shift change report given to Guy Pimentel RN by Padmaja Elias RN. Report included the following information SBAR, Kardex, Intake/Output and MAR.

## 2018-02-22 NOTE — PROGRESS NOTES
Progress Note      Patient: Quan Handy               Sex: male          DOA: 2/20/2018         YOB: 1945      Age:  67 y.o.        LOS:  LOS: 2 days                  Procedure(s):  lumbar 4,5  TRANSFORAMINAL LUMBAR INTERBODY FUSION with pedicle screw placement , left iliac crest needle aspiration and exploration of hardware and fusion, hardware removal   1. Removal of L3-L4 hardware. 2.  Pedicle screw instrumentation, L3 to L5.  3.  Left L4-L5 transforaminal lumbar interbody fusion. 4.  Posterolateral fusion, L4-L5. 5.  Left iliac crest needle aspiration for the purpose of grafting. 6.  Fluoroscopy. 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  Herniation of nucleus pulposus  Adjacent segment disease with spinal stenosis        Past Medical History:   Diagnosis Date    Acute tonsillitis     Back pain     Community acquired pneumonia     Degenerative disc disease     Diverticulosis     DM type 2 (diabetes mellitus, type 2) ([facility] [address]     Dyslipidemia     Frequency of urination     GERD (gastroesophageal reflux disease)     H/O joint replacement     Hearing reduced     Hypercholesteremia     Hyperlipidemia     Hypertension     Hypothyroidism     Kidney stones     Neuropathy     Nocturia     Radiculopathy     Sleep apnea     Does not use CPAP. Never had sleep study    Thyroid disease     Varicella     Vision decreased        Past Surgical History:   Procedure Laterality Date    HX CHOLECYSTECTOMY      HX HERNIA REPAIR       x 3    HX LUMBAR DISKECTOMY  2007    x2    HX LUMBAR FUSION      HX OTHER SURGICAL      ORTHOPEDIC SURGERY    TOTAL HIP ARTHROPLASTY      Both hips       POD# 2  SUBJECTIVE:  Doing well. Some expected numbness at surgical site. Has pain at surgical site and not getting relief with 1 tab of norco.  Denies numbness, tingling, burning, and radiation to BLE.  + flatus.   States has not had a BM since Sunday.       OBJECTIVE:  Patient Vitals for the past 24 hrs:   Temp Pulse Resp BP SpO2   02/22/18 0815 98.2 °F (36.8 °C) 87 20 120/79 95 %   02/22/18 0022 97.3 °F (36.3 °C) 89 21 127/88 93 %   02/21/18 2035 97.5 °F (36.4 °C) 88 21 (!) 152/92 91 %   02/21/18 1355 97.8 °F (36.6 °C) 99 21 (!) 147/97 94 %   02/21/18 1048 98.1 °F (36.7 °C) 100 20 (!) 144/109 95 %           Vent         Intake and Output    Intake/Output Summary (Last 24 hours) at 02/22/18 0913  Last data filed at 02/22/18 0815   Gross per 24 hour   Intake              240 ml   Output             2120 ml   Net            -1880 ml         Data    Recent Results (from the past 24 hour(s))   HEMOGLOBIN A1C W/O EAG    Collection Time: 02/21/18 12:10 PM   Result Value Ref Range    Hemoglobin A1c 7.3 (H) 4.2 - 5.6 %   GLUCOSE, POC    Collection Time: 02/21/18 12:59 PM   Result Value Ref Range    Glucose (POC) 155 (H) 70 - 110 mg/dL   GLUCOSE, POC    Collection Time: 02/21/18  4:35 PM   Result Value Ref Range    Glucose (POC) 185 (H) 70 - 110 mg/dL   GLUCOSE, POC    Collection Time: 02/21/18  9:12 PM   Result Value Ref Range    Glucose (POC) 172 (H) 70 - 110 mg/dL   GLUCOSE, POC    Collection Time: 02/22/18  6:09 AM   Result Value Ref Range    Glucose (POC) 129 (H) 70 - 110 mg/dL        Current Facility-Administered Medications   Medication Dose Route Frequency    HYDROcodone-acetaminophen (NORCO) 5-325 mg per tablet 1-2 Tab  1-2 Tab Oral Q4H PRN    polyethylene glycol (MIRALAX) packet 17 g  17 g Oral DAILY    docusate sodium (COLACE) capsule 100 mg  100 mg Oral DAILY    bisacodyl (DULCOLAX) suppository 10 mg  10 mg Rectal DAILY PRN    glucose chewable tablet 16 g  4 Tab Oral PRN    glucagon (GLUCAGEN) injection 1 mg  1 mg IntraMUSCular PRN    dextrose (D50W) injection syrg 12.5-25 g  25-50 mL IntraVENous PRN    insulin lispro (HUMALOG) injection   SubCUTAneous AC&HS    methocarbamol (ROBAXIN) tablet 750 mg  750 mg Oral QID PRN    bisacodyl (DULCOLAX) tablet 10 mg  10 mg Oral DAILY PRN    sodium chloride (NS) flush 5-10 mL  5-10 mL IntraVENous Q8H    sodium chloride (NS) flush 5-10 mL  5-10 mL IntraVENous PRN    acetaminophen (TYLENOL) tablet 650 mg  650 mg Oral Q4H PRN    naloxone (NARCAN) injection 0.4 mg  0.4 mg IntraVENous PRN    ondansetron (ZOFRAN) injection 4 mg  4 mg IntraVENous Q4H PRN    phenol throat spray (CHLORASEPTIC) 1 Spray  1 Spray Oral PRN    benzocaine-menthol (CEPACOL) lozenge 1 Lozenge  1 Lozenge Oral PRN    diphenhydrAMINE (BENADRYL) injection 12.5 mg  12.5 mg IntraVENous Q4H PRN    diphenhydrAMINE (BENADRYL) capsule 25 mg  25 mg Oral Q4H PRN    magnesium hydroxide (MILK OF MAGNESIA) 400 mg/5 mL oral suspension 30 mL  30 mL Oral DAILY PRN    zolpidem (AMBIEN) tablet 5 mg  5 mg Oral QHS PRN    amitriptyline (ELAVIL) tablet 10 mg  10 mg Oral QHS    carvedilol (COREG) tablet 3.125 mg  3.125 mg Oral BID WITH MEALS    levothyroxine (SYNTHROID) tablet 125 mcg  125 mcg Oral ACB    telmisartan (MICARDIS) tablet 20 mg  20 mg Oral DAILY    HYDROmorphone (DILAUDID) injection 1 mg  1 mg IntraVENous Q4H PRN       Physical Exam  General:  Neurologic: Alert and oriented X 3  Sensory: normal to legs. Eyes: conjunctivae/corneas clear. PERRL, EOM's intact. Motor: Iliopsoas 5/5, Quads 5/5, Anterior Tibial 5/5, Gastrocnemius Medial 5/5, and Extensor Hallucis Longus 5/5  Jun negative. Denies tenderness to deep palpitation to lateral and medial thighs and calves. Skin: Incision to lower back with prineo intact, edges well approximated, no erythema, no edema, no drainage, no ecchymosis, and no hematoma. hemovac to bulb suction. Draining small amounts of sero-sang drainage. Assessment/Plan    67y.o. year old male who is hospital day 2 for Herniation of nucleus pulposus. 1.)  Neurologic:  Doing well. Ice pack PRN for pain along with PO analgesics and antispasmodics. Norco adjusted to 1-2 tabs.   Discussed use of robaxin to augment analgesics. Patient states understanding. OOB with brace. Mobilize. PT. Hemovac DCD    2.)  Cardiovascular:  Maintain SBP < 140. Telemetry DCD.    3.)  Pulmonary:  Pulmonary toilet.       Assessment and plan made with Dr. Fabiola Almanzar, NP  2/22/2018

## 2018-02-22 NOTE — PROGRESS NOTES
1002: 1st attempt PT session. 5/10 pain; requesting pain meds prior to mobility. RN Suresh Sims notified. Will follow up. 1120: 2nd PT attempt. IV dilaudid given at 1046. Patient now in bed; very drowsy. Declines mobility at this time. Will follow up to maximize safety. 1328: 3rd PT attempt. Lights off, door closed. Patient sleeping soundly. Does not arouse to voice or knock. Will follow up.      Thank you,     Gurdeep Merino, PT, DPT

## 2018-02-22 NOTE — DISCHARGE INSTRUCTIONS
DISCHARGE SUMMARY from Nurse    PATIENT INSTRUCTIONS:    After general anesthesia or intravenous sedation, for 24 hours or while taking prescription Narcotics:  · Limit your activities  · Do not drive and operate hazardous machinery  · Do not make important personal or business decisions  · Do  not drink alcoholic beverages  · If you have not urinated within 8 hours after discharge, please contact your surgeon on call. Report the following to your surgeon:  · Excessive pain, swelling, redness or odor of or around the surgical area  · Temperature over 100.5  · Nausea and vomiting lasting longer than 4 hours or if unable to take medications  · Any signs of decreased circulation or nerve impairment to extremity: change in color, persistent  numbness, tingling, coldness or increase pain  · Any questions    What to do at Home:  Recommended activity: Activity as tolerated, see surgeon's instructions. If you experience any of the following symptoms severe pain, nausea and vomiting, fever above 100.5, bleeding or drainage from incision, shortness of breath, please follow up with Dr. Shruthi Mederos. *  Please give a list of your current medications to your Primary Care Provider. *  Please update this list whenever your medications are discontinued, doses are      changed, or new medications (including over-the-counter products) are added. *  Please carry medication information at all times in case of emergency situations. These are general instructions for a healthy lifestyle:    No smoking/ No tobacco products/ Avoid exposure to second hand smoke  Surgeon General's Warning:  Quitting smoking now greatly reduces serious risk to your health.     Obesity, smoking, and sedentary lifestyle greatly increases your risk for illness    A healthy diet, regular physical exercise & weight monitoring are important for maintaining a healthy lifestyle    You may be retaining fluid if you have a history of heart failure or if you experience any of the following symptoms:  Weight gain of 3 pounds or more overnight or 5 pounds in a week, increased swelling in our hands or feet or shortness of breath while lying flat in bed. Please call your doctor as soon as you notice any of these symptoms; do not wait until your next office visit. Recognize signs and symptoms of STROKE:    F-face looks uneven    A-arms unable to move or move unevenly    S-speech slurred or non-existent    T-time-call 911 as soon as signs and symptoms begin-DO NOT go       Back to bed or wait to see if you get better-TIME IS BRAIN. Warning Signs of HEART ATTACK     Call 911 if you have these symptoms:   Chest discomfort. Most heart attacks involve discomfort in the center of the chest that lasts more than a few minutes, or that goes away and comes back. It can feel like uncomfortable pressure, squeezing, fullness, or pain.  Discomfort in other areas of the upper body. Symptoms can include pain or discomfort in one or both arms, the back, neck, jaw, or stomach.  Shortness of breath with or without chest discomfort.  Other signs may include breaking out in a cold sweat, nausea, or lightheadedness. Don't wait more than five minutes to call 911 - MINUTES MATTER! Fast action can save your life. Calling 911 is almost always the fastest way to get lifesaving treatment. Emergency Medical Services staff can begin treatment when they arrive -- up to an hour sooner than if someone gets to the hospital by car. The discharge information has been reviewed with the patient. The patient verbalized understanding. Discharge medications reviewed with the patient and appropriate educational materials and side effects teaching were provided. Patient armband removed and shredded.   ___________________________________________________________________________________________________________________________________

## 2018-02-22 NOTE — PROGRESS NOTES
Internal Medicine Progress Note    Patient's Name: Nan Ramirez  Admit Date: 2/20/2018  Length of Stay: 2      Assessment/Plan     Active Hospital Problems    Diagnosis Date Noted    Herniation of nucleus pulposus 02/20/2018    Adjacent segment disease with spinal stenosis 02/20/2018    Thyroid disease 02/20/2018    Sleep apnea 02/20/2018     Does not use CPAP. Never had sleep study      Hyperlipidemia 02/20/2018    Hypertension 02/20/2018    GERD (gastroesophageal reflux disease) 02/20/2018    DM type 2 (diabetes mellitus, type 2) ([facility] [address] 02/20/2018     - Diet and mobilization per primary team  - PT/OT  - Pain control PRN  - BP in good range  - F/u neurosurg  - MOM ordered  - SSI and accuchecks  - Cont acceptable home medications for chronic conditions   - DVT protocol    I have personally reviewed all pertinent labs and films that have officially resulted over the last 24 hours. I have personally checked for all pending labs that are awaiting final results. Subjective     Pt s/e @ bedside  No major events overnight  Doing well and only w/ incisional pain today  Constipated  Denies CP or SOB    Objective     Visit Vitals    /79 (BP 1 Location: Left arm, BP Patient Position: At rest)    Pulse 87    Temp 98.2 °F (36.8 °C)    Resp 20    Ht 5' 10\" (1.778 m)    Wt 87.5 kg (193 lb)    SpO2 95%    BMI 27.69 kg/m2       Physical Exam:  General Appearance: NAD, conversant  Lungs: CTA with normal respiratory effort  CV: RRR, no m/r/g  Abdomen: soft, non-tender, normal bowel sounds  Extremities: no cyanosis, no peripheral edema  Neuro: No focal deficits, motor/sensory relatively intact w/ only mild LLE weakness    Lab/Data Reviewed:  BMP: No results found for: NA, K, CL, CO2, AGAP, GLU, BUN, CREA, GFRAA, GFRNA  CBC: No results found for: WBC, HGB, HGBEXT, HCT, HCTEXT, PLT, PLTEXT, HGBEXT, HCTEXT, PLTEXT    Imaging Reviewed:  No results found.     Medications Reviewed:  Current Facility-Administered Medications   Medication Dose Route Frequency    HYDROcodone-acetaminophen (NORCO) 5-325 mg per tablet 1-2 Tab  1-2 Tab Oral Q4H PRN    polyethylene glycol (MIRALAX) packet 17 g  17 g Oral DAILY    docusate sodium (COLACE) capsule 100 mg  100 mg Oral DAILY    bisacodyl (DULCOLAX) suppository 10 mg  10 mg Rectal DAILY PRN    magnesium hydroxide (MILK OF MAGNESIA) 400 mg/5 mL oral suspension 30 mL  30 mL Oral ONCE    glucose chewable tablet 16 g  4 Tab Oral PRN    glucagon (GLUCAGEN) injection 1 mg  1 mg IntraMUSCular PRN    dextrose (D50W) injection syrg 12.5-25 g  25-50 mL IntraVENous PRN    insulin lispro (HUMALOG) injection   SubCUTAneous AC&HS    methocarbamol (ROBAXIN) tablet 750 mg  750 mg Oral QID PRN    bisacodyl (DULCOLAX) tablet 10 mg  10 mg Oral DAILY PRN    sodium chloride (NS) flush 5-10 mL  5-10 mL IntraVENous Q8H    sodium chloride (NS) flush 5-10 mL  5-10 mL IntraVENous PRN    acetaminophen (TYLENOL) tablet 650 mg  650 mg Oral Q4H PRN    naloxone (NARCAN) injection 0.4 mg  0.4 mg IntraVENous PRN    ondansetron (ZOFRAN) injection 4 mg  4 mg IntraVENous Q4H PRN    phenol throat spray (CHLORASEPTIC) 1 Spray  1 Spray Oral PRN    benzocaine-menthol (CEPACOL) lozenge 1 Lozenge  1 Lozenge Oral PRN    diphenhydrAMINE (BENADRYL) injection 12.5 mg  12.5 mg IntraVENous Q4H PRN    diphenhydrAMINE (BENADRYL) capsule 25 mg  25 mg Oral Q4H PRN    magnesium hydroxide (MILK OF MAGNESIA) 400 mg/5 mL oral suspension 30 mL  30 mL Oral DAILY PRN    zolpidem (AMBIEN) tablet 5 mg  5 mg Oral QHS PRN    amitriptyline (ELAVIL) tablet 10 mg  10 mg Oral QHS    carvedilol (COREG) tablet 3.125 mg  3.125 mg Oral BID WITH MEALS    levothyroxine (SYNTHROID) tablet 125 mcg  125 mcg Oral ACB    telmisartan (MICARDIS) tablet 20 mg  20 mg Oral DAILY    HYDROmorphone (DILAUDID) injection 1 mg  1 mg IntraVENous Q4H PRN           Horace Metzger DO  Internal Medicine, Hospitalist  Pager: [phone]

## 2018-02-22 NOTE — PROGRESS NOTES
Shift Progress Note:  Assumed care of patient in bed awake alert, medicated x2 for pain. OOB around hallway x1 Currently oob sitting in chair for breakfast. No insulin coverage needed. Call bell within reach. Uneventful night.   Patient Vitals for the past 12 hrs:   Temp Pulse Resp BP SpO2   02/22/18 0022 97.3 °F (36.3 °C) 89 21 127/88 93 %   02/21/18 2035 97.5 °F (36.4 °C) 88 21 (!) 152/92 91 %

## 2018-02-23 LAB
GLUCOSE BLD STRIP.AUTO-MCNC: 153 MG/DL (ref 70–110)
GLUCOSE BLD STRIP.AUTO-MCNC: 237 MG/DL (ref 70–110)
GLUCOSE BLD STRIP.AUTO-MCNC: 262 MG/DL (ref 70–110)
GLUCOSE BLD STRIP.AUTO-MCNC: 280 MG/DL (ref 70–110)

## 2018-02-23 PROCEDURE — 65270000029 HC RM PRIVATE

## 2018-02-23 PROCEDURE — 74011250637 HC RX REV CODE- 250/637: Performed by: HOSPITALIST

## 2018-02-23 PROCEDURE — 74011250637 HC RX REV CODE- 250/637: Performed by: NURSE PRACTITIONER

## 2018-02-23 PROCEDURE — 97116 GAIT TRAINING THERAPY: CPT

## 2018-02-23 PROCEDURE — 82962 GLUCOSE BLOOD TEST: CPT

## 2018-02-23 PROCEDURE — 74011636637 HC RX REV CODE- 636/637: Performed by: HOSPITALIST

## 2018-02-23 PROCEDURE — 74011250637 HC RX REV CODE- 250/637: Performed by: PHYSICIAN ASSISTANT

## 2018-02-23 PROCEDURE — 74011250636 HC RX REV CODE- 250/636: Performed by: NEUROLOGICAL SURGERY

## 2018-02-23 RX ORDER — CELECOXIB 100 MG/1
200 CAPSULE ORAL DAILY
Status: DISCONTINUED | OUTPATIENT
Start: 2018-02-23 | End: 2018-02-26 | Stop reason: HOSPADM

## 2018-02-23 RX ORDER — DEXAMETHASONE SODIUM PHOSPHATE 4 MG/ML
6 INJECTION, SOLUTION INTRA-ARTICULAR; INTRALESIONAL; INTRAMUSCULAR; INTRAVENOUS; SOFT TISSUE ONCE
Status: COMPLETED | OUTPATIENT
Start: 2018-02-23 | End: 2018-02-23

## 2018-02-23 RX ADMIN — AMITRIPTYLINE HYDROCHLORIDE 10 MG: 10 TABLET, FILM COATED ORAL at 21:26

## 2018-02-23 RX ADMIN — CELECOXIB 200 MG: 100 CAPSULE ORAL at 13:46

## 2018-02-23 RX ADMIN — HYDROCODONE BITARTRATE AND ACETAMINOPHEN 2 TABLET: 5; 325 TABLET ORAL at 16:26

## 2018-02-23 RX ADMIN — Medication 10 ML: at 13:25

## 2018-02-23 RX ADMIN — Medication 10 ML: at 21:27

## 2018-02-23 RX ADMIN — HYDROCODONE BITARTRATE AND ACETAMINOPHEN 1 TABLET: 5; 325 TABLET ORAL at 21:26

## 2018-02-23 RX ADMIN — METHOCARBAMOL 750 MG: 750 TABLET ORAL at 20:11

## 2018-02-23 RX ADMIN — INSULIN LISPRO 4 UNITS: 100 INJECTION, SOLUTION INTRAVENOUS; SUBCUTANEOUS at 12:27

## 2018-02-23 RX ADMIN — INSULIN LISPRO 4 UNITS: 100 INJECTION, SOLUTION INTRAVENOUS; SUBCUTANEOUS at 17:10

## 2018-02-23 RX ADMIN — HYDROCODONE BITARTRATE AND ACETAMINOPHEN 2 TABLET: 5; 325 TABLET ORAL at 12:21

## 2018-02-23 RX ADMIN — DEXAMETHASONE SODIUM PHOSPHATE 6 MG: 4 INJECTION, SOLUTION INTRAMUSCULAR; INTRAVENOUS at 09:18

## 2018-02-23 RX ADMIN — INSULIN LISPRO 6 UNITS: 100 INJECTION, SOLUTION INTRAVENOUS; SUBCUTANEOUS at 21:43

## 2018-02-23 RX ADMIN — LEVOTHYROXINE SODIUM 125 MCG: 75 TABLET ORAL at 06:38

## 2018-02-23 RX ADMIN — CARVEDILOL 3.12 MG: 3.12 TABLET, FILM COATED ORAL at 09:18

## 2018-02-23 RX ADMIN — CARVEDILOL 3.12 MG: 3.12 TABLET, FILM COATED ORAL at 17:13

## 2018-02-23 RX ADMIN — BISACODYL 10 MG: 5 TABLET, COATED ORAL at 09:24

## 2018-02-23 RX ADMIN — BISACODYL 10 MG: 10 SUPPOSITORY RECTAL at 09:25

## 2018-02-23 RX ADMIN — POLYETHYLENE GLYCOL (3350) 17 G: 17 POWDER, FOR SOLUTION ORAL at 09:19

## 2018-02-23 RX ADMIN — DOCUSATE SODIUM 100 MG: 100 CAPSULE, LIQUID FILLED ORAL at 09:18

## 2018-02-23 RX ADMIN — TELMISARTAN 20 MG: 40 TABLET ORAL at 09:18

## 2018-02-23 RX ADMIN — METHOCARBAMOL 750 MG: 750 TABLET ORAL at 08:00

## 2018-02-23 RX ADMIN — METHOCARBAMOL 750 MG: 750 TABLET ORAL at 13:47

## 2018-02-23 RX ADMIN — HYDROCODONE BITARTRATE AND ACETAMINOPHEN 2 TABLET: 5; 325 TABLET ORAL at 07:22

## 2018-02-23 NOTE — ANCILLARY DISCHARGE INSTRUCTIONS
Patient and/or next of kin has been given the [employer] Important Message From Medicare About Your Rights\" letter and all questions were answered.

## 2018-02-23 NOTE — INTERDISCIPLINARY ROUNDS
IDR Summary      Patient: Sade Pop MRN: [id number]    Age: 67 y.o.  : 1945     Admit Diagnosis: l4-5 hnp adjacent segment disease  m96.3  Herniation of nucleus pulposus  Adjacent segment disease with spinal stenosis      POD #: 2    DIET status: Diabetic     Lines/Tubes:   IV: YES   Needed: YES  Matias: NO  Needed:NO  Central Line: NO Needed: NO      VTE Prophylaxis: Mechanical    Mobility needs: Yes     PT ordered:  YES PT eval on chart: YES    OT ordered:  NO OT eval on chart: NO      ST ordered:  NO ST eval on chart:  NO     Disposition/Care Management:  Discharge plan: Home with [address] ordered? NO     Recommended DME from PT/OT:      DME ordered? NO     SNF- has patient been matched? NO    Accepting bed? NO   Does patient require insurance auth?   NO      Barriers to discharge:   Financial concerns:No   PCP: Tai Alvarez MD    : NO  Interventions:       LOS: 3 days     Expected days until discharge:           Signed:     ELIECER Webb-BC  [address]  Hospitalist Division  Pager:  [phone]  Office:  [phone]

## 2018-02-23 NOTE — PROGRESS NOTES
5058: 1st PT attempt. 4/10 back pain; refuses amb or OOB to chair d/t pain. NP [place] Sukhdeep notified. Will follow up.      Thank you,     Sara Buchanan, PT, DPT

## 2018-02-23 NOTE — PROGRESS NOTES
Internal Medicine Progress Note    Patient's Name: Gilles Bass  Admit Date: 2/20/2018  Length of Stay: 3      Assessment/Plan     Active Hospital Problems    Diagnosis Date Noted    Herniation of nucleus pulposus 02/20/2018    Adjacent segment disease with spinal stenosis 02/20/2018    Thyroid disease 02/20/2018    Sleep apnea 02/20/2018     Does not use CPAP. Never had sleep study      Hyperlipidemia 02/20/2018    Hypertension 02/20/2018    GERD (gastroesophageal reflux disease) 02/20/2018    DM type 2 (diabetes mellitus, type 2) ([facility] [address] 02/20/2018     - Diet and mobilization per primary team  - PT/OT  - Pain control PRN  - BP in good range  - F/u neurosurg  - Will try dulcolax PO and supp  - SSI and accuchecks  - Dispo per primary  - Cont acceptable home medications for chronic conditions   - DVT protocol    I have personally reviewed all pertinent labs and films that have officially resulted over the last 24 hours. I have personally checked for all pending labs that are awaiting final results.     Subjective     Pt s/e @ bedside  Apparently very confused this AM, but seems to be back to baseline on my interaction  Admits to constipation still  Denies CP or SOB    Objective     Visit Vitals    /79 (BP 1 Location: Left arm, BP Patient Position: At rest)    Pulse (!) 101    Temp 98 °F (36.7 °C)    Resp 16    Ht 5' 10\" (1.778 m)    Wt 87.5 kg (193 lb)    SpO2 92%    BMI 27.69 kg/m2       Physical Exam:  General Appearance: NAD, conversant  Lungs: CTA with normal respiratory effort  CV: RRR, no m/r/g  Abdomen: soft, non-tender, normal bowel sounds  Extremities: no cyanosis, no peripheral edema  Neuro: No focal deficits, motor/sensory relatively intact    Lab/Data Reviewed:  BMP: No results found for: NA, K, CL, CO2, AGAP, GLU, BUN, CREA, GFRAA, GFRNA  CBC: No results found for: WBC, HGB, HGBEXT, HCT, HCTEXT, PLT, PLTEXT, HGBEXT, HCTEXT, PLTEXT    Imaging Reviewed:  No results found.    Medications Reviewed:  Current Facility-Administered Medications   Medication Dose Route Frequency    celecoxib (CELEBREX) capsule 200 mg  200 mg Oral DAILY    HYDROcodone-acetaminophen (NORCO) 5-325 mg per tablet 1-2 Tab  1-2 Tab Oral Q4H PRN    polyethylene glycol (MIRALAX) packet 17 g  17 g Oral DAILY    docusate sodium (COLACE) capsule 100 mg  100 mg Oral DAILY    bisacodyl (DULCOLAX) suppository 10 mg  10 mg Rectal DAILY PRN    glucose chewable tablet 16 g  4 Tab Oral PRN    glucagon (GLUCAGEN) injection 1 mg  1 mg IntraMUSCular PRN    dextrose (D50W) injection syrg 12.5-25 g  25-50 mL IntraVENous PRN    insulin lispro (HUMALOG) injection   SubCUTAneous AC&HS    methocarbamol (ROBAXIN) tablet 750 mg  750 mg Oral QID PRN    bisacodyl (DULCOLAX) tablet 10 mg  10 mg Oral DAILY PRN    sodium chloride (NS) flush 5-10 mL  5-10 mL IntraVENous Q8H    sodium chloride (NS) flush 5-10 mL  5-10 mL IntraVENous PRN    acetaminophen (TYLENOL) tablet 650 mg  650 mg Oral Q4H PRN    naloxone (NARCAN) injection 0.4 mg  0.4 mg IntraVENous PRN    ondansetron (ZOFRAN) injection 4 mg  4 mg IntraVENous Q4H PRN    phenol throat spray (CHLORASEPTIC) 1 Spray  1 Spray Oral PRN    benzocaine-menthol (CEPACOL) lozenge 1 Lozenge  1 Lozenge Oral PRN    diphenhydrAMINE (BENADRYL) injection 12.5 mg  12.5 mg IntraVENous Q4H PRN    diphenhydrAMINE (BENADRYL) capsule 25 mg  25 mg Oral Q4H PRN    magnesium hydroxide (MILK OF MAGNESIA) 400 mg/5 mL oral suspension 30 mL  30 mL Oral DAILY PRN    amitriptyline (ELAVIL) tablet 10 mg  10 mg Oral QHS    carvedilol (COREG) tablet 3.125 mg  3.125 mg Oral BID WITH MEALS    levothyroxine (SYNTHROID) tablet 125 mcg  125 mcg Oral ACB    telmisartan (MICARDIS) tablet 20 mg  20 mg Oral DAILY           Tian Case DO  Internal Medicine, Hospitalist  Pager: [phone]  [facility]

## 2018-02-23 NOTE — PROGRESS NOTES
Shift Progress Note:  Assumed care of patient in bed awake and alert, resting most of the night but found oob @0530 with urine in bed and a trail of urine from bathroom to chair. No back brace on and patient found sitting in chair stating \" I'm so tired. Back brace placed on patient after cleansing and linen changed. Patient requested and received coffee. Now sitting on side of bed, alert and oriented drinking coffee. No pain medication requested, callbell within reach. 7:08 AM  Back in bed, brace off, awake & quiet.   Patient Vitals for the past 12 hrs:   Temp Pulse Resp BP SpO2   02/23/18 0411 98.4 °F (36.9 °C) (!) 104 16 (!) 143/93 95 %   02/22/18 2208 98.1 °F (36.7 °C) 89 20 113/79 92 %

## 2018-02-23 NOTE — PROGRESS NOTES
Per IDR, pt very confused this morning. Discharge put off to tomorrow. Paged Dr. Berny Do to get home health orders. Discharge plan: Home with home health Saturday.

## 2018-02-23 NOTE — ROUTINE PROCESS
Bedside and Verbal shift change report given to Gail RN (oncoming nurse) by Santosh Galicia   (offgoing nurse). Report included the following information SBAR, Kardex, Intake/Output and MAR.

## 2018-02-23 NOTE — INTERDISCIPLINARY ROUNDS
IDR Summary      Patient: Sade Pop MRN: [id number]    Age: 67 y.o.  : 1945     Admit Diagnosis: l4-5 hnp adjacent segment disease  m96.3  Herniation of nucleus pulposus  Adjacent segment disease with spinal stenosis      POD #: 3    DIET status: Diabetic     Lines/Tubes:   IV: YES   Needed: YES  Matias: NO  Needed:NO  Central Line: NO Needed: NO      VTE Prophylaxis: Mechanical    Mobility needs: Yes     PT ordered:  YES PT eval on chart: YES    OT ordered:  NO OT eval on chart: NO      ST ordered:  NO ST eval on chart:  NO     Disposition/Care Management:  Discharge plan: Home with [address] ordered? NO     Recommended DME from PT/OT:      DME ordered? NO     SNF- has patient been matched? NO    Accepting bed? NO   Does patient require insurance auth?   NO      Barriers to discharge:   Financial concerns:No   PCP: Tai Alvarez MD    : NO  Interventions:       LOS: 3 days     Expected days until discharge: tomorrow           Signed:     ELIECER Webb-BC  [address]  Hospitalist Division  Pager:  [phone]  Office:  [phone]

## 2018-02-23 NOTE — PROGRESS NOTES
Care Management Interventions  PCP Verified by CM:  Yes  Transition of Care Consult (CM Consult): [address]: No  Reason Outside [place]: Patient already serviced by other home care/hospice agency  Physical Therapy Consult: Yes  Current Support Network: Lives with Spouse  Plan discussed with Pt/Family/Caregiver: Yes  Discharge Location  Discharge Placement: Home with home health ([address])

## 2018-02-23 NOTE — PROGRESS NOTES
Received confirmation to place home health orders by both Dr. Crystal Armas and Dr. Delories Aschoff midlevel. When giving pt FOC he stated he does not need nor want home health and refused to sign FOC. Home health order cancelled- reason- pt declined. Discharge plan: Home.

## 2018-02-23 NOTE — ROUTINE PROCESS
Bedside and Verbal shift change report given to [facility] (oncoming nurse) by Milla Matthews RN   (offgoing nurse). Report included the following information SBAR, Kardex, MAR and Recent Results.

## 2018-02-23 NOTE — PROGRESS NOTES
0720 received pt sitting on the chair. IV site no sign of infiltration. Dressing at the back dry and intact. Hemovac still on. Pt still on telebox with continuous pulse ox. As per pt, Norco 1 tab is not controlling the pain well. Pt has been smiling during the entire assessment. Speech is clear, no problem swallowing. All extremities moving, denies any numbness and tingling on fingers and toes. Encouraged use of IS.    0930 Hemovac removed by MD. Incision now open to air. Looks dry and intact. 1830 Pt walked in the hallway twice with back brace on, pt using walker and one assist. as per pt, walking relieves back and leg discomfort. Pt has been on the bedside recliner twice today. Activity well tolerated. IV site no sign of infiltration. Pain under control with Dilaudid and Norco. Can wiggle toes and fingers, moving all extremities spontaneously. No problem swallowing, speech is clear. 1900 Mentioned about tingling on the leg close to ankle and a pain on the left buttock that just happened this afternoon. As per pt it was not there yesterday. Report given to incoming nurse for monitoring.

## 2018-02-23 NOTE — PROGRESS NOTES
Care Management Interventions  PCP Verified by CM:  Yes  Transition of Care Consult (CM Consult): [address]: No  Reason Outside [place]: Patient already serviced by other home care/hospice agency, Patient declined ordered home care/hospice services  Physical Therapy Consult: Yes  Current Support Network: Lives with Spouse  Plan discussed with Pt/Family/Caregiver: Yes  Discharge Location  Discharge Placement: Home with home health

## 2018-02-23 NOTE — PROGRESS NOTES
Problem: Mobility Impaired (Adult and Pediatric)  Goal: *Acute Goals and Plan of Care (Insert Text)  Physical Therapy Goals  Initiated 2/21/2018 and to be accomplished within 7 day(s)  1. Patient will ambulate with modified independence for 500 feet with the least restrictive device. 2.  Patient will ascend/descend 10 stairs with handrail(s) with modified independence. Outcome: Progressing Towards Goal  physical Therapy TREATMENT    Patient: Romana Boards (33 y.o. male)  Date: 2/23/2018  Diagnosis: l4-5 hnp adjacent segment disease  m96.3  Herniation of nucleus pulposus  Adjacent segment disease with spinal stenosis Herniation of nucleus pulposus  Procedure(s) (LRB):  lumbar 4,5  TRANSFORAMINAL LUMBAR INTERBODY FUSION with pedicle screw placement , left iliac crest needle aspiration and exploration of hardware and fusion, hardware removal (N/A) 3 Days Post-Op  Precautions: Fall  Chart, physical therapy assessment, plan of care and goals were reviewed. PLOF: Independent     ASSESSMENT:  Agreeable to PT with max encouragement. Reinforced role of PT and back precautions. Min A and verbal cues for log roll for supine to sit. Supervision for sit to stand. Cues for proper hand placement. Amb 200ft supervision with ww. Returned to bed with supervision. SCDs on. REINIER Holbrook present with pain meds. All needs in reach. EDUCATION: bed mobility, transfers, balance, precautions, amb  Progression toward goals:        Improving appropriately and progressing toward goals     PLAN:  Patient continues to benefit from skilled intervention to address the above impairments. Continue treatment per established plan of care. Discharge Recommendations:  None  Further Equipment Recommendations for Discharge:  rolling walker     SUBJECTIVE:   Patient stated I was up all morning.     OBJECTIVE DATA SUMMARY:   Critical Behavior:  Neurologic State: Alert  Orientation Level: Oriented to person  Cognition: Appropriate decision making, Follows commands  Safety/Judgement: Awareness of environment, Fall prevention    [employer] Balance Scale 4/5  0: Pt performs 25% or less of standing activity (Max assist) CN, 100% impaired. 1: Pt supports self with upper extremities but requires therapist assistance. Pt performs 25-50% of effort (Mod assist) CM, 80% to <100% impaired. 1+: Pt supports self with upper extremities but requires therapist assistance. Pt performs >50% effort. (Min assist). CL, 60% to <80% impaired. 2: Pt supports self independently with both upper extremities (walker, crutches, parallel bars). CL, 60% to <80% impaired. 2+: Pt support self independently with 1 upper extremity (cane, crutch, 1 parallel bar). CK, 40% to <60% impaired. 3: Pt stands without upper extremity support for up to 30 seconds. CK, 40% to <60% impaired. 3+: Pt stands without upper extremity support for 30 seconds or greater. CJ, 20% to <40% impaired. 4: Pt independently moves and returns center of gravity 1-2 inches in one plane. CJ, 20% to <40% impaired. 4+: Pt independently moves and returns center of gravity 1-2 inches in multiple planes. CI, 1% to <20% impaired. 5: Pt independently moves and returns center of gravity in all planes greater than 2 inches. CH, 0% impaired. G CODE:Mobility [id number] Current  CJ= 20-39%   Goal  CI= 1-19%.   The severity rating is based on the [employer] Balance Scale 4/5  Functional Mobility Training:  Bed Mobility:  Supine to Sit: Minimum assistance  Sit to Supine: Supervision  Transfers:  Sit to Stand: Supervision  Stand to Sit: Supervision  Balance:  Sitting: Intact  Standing: Impaired  Standing - Static: Fair  Standing - Dynamic : Fair  Ambulation/Gait Training:  Distance (ft): 200 Feet (ft)  Assistive Device: Walker, rolling  Ambulation - Level of Assistance: Supervision  Gait Description (WDL): Exceptions to WDL  Gait Abnormalities: Antalgic  Vital Signs  Temp: 98 °F (36.7 °C)     Pulse (Heart Rate): (!) 101     BP: 120/79     Resp Rate: 16     O2 Sat (%): 92 %  Pain:  Pre treatment pain level: 5  Post treatment pain level: 6  Pain Scale 1: Numeric (0 - 10)  Pain Intensity 1: 6  Pain Location 1: Back  Pain Orientation 1: Lower; Posterior  Pain Description 1: Aching; Sore  Pain Intervention(s) 1: Medication (see MAR)  Activity Tolerance:   Fair     After treatment:   Patient left in no apparent distress in bed  Call bell left within reach  Nursing notified      Roosevelt Dickens PT   Time Calculation: 11 mins

## 2018-02-24 LAB
GLUCOSE BLD STRIP.AUTO-MCNC: 168 MG/DL (ref 70–110)
GLUCOSE BLD STRIP.AUTO-MCNC: 177 MG/DL (ref 70–110)
GLUCOSE BLD STRIP.AUTO-MCNC: 213 MG/DL (ref 70–110)
GLUCOSE BLD STRIP.AUTO-MCNC: 258 MG/DL (ref 70–110)

## 2018-02-24 PROCEDURE — 74011250637 HC RX REV CODE- 250/637: Performed by: HOSPITALIST

## 2018-02-24 PROCEDURE — 74011250637 HC RX REV CODE- 250/637: Performed by: NURSE PRACTITIONER

## 2018-02-24 PROCEDURE — 74011250636 HC RX REV CODE- 250/636: Performed by: NEUROLOGICAL SURGERY

## 2018-02-24 PROCEDURE — 82962 GLUCOSE BLOOD TEST: CPT

## 2018-02-24 PROCEDURE — 74011636637 HC RX REV CODE- 636/637: Performed by: HOSPITALIST

## 2018-02-24 PROCEDURE — 74011250636 HC RX REV CODE- 250/636: Performed by: PHYSICIAN ASSISTANT

## 2018-02-24 PROCEDURE — 74011250637 HC RX REV CODE- 250/637: Performed by: NEUROLOGICAL SURGERY

## 2018-02-24 PROCEDURE — 65270000029 HC RM PRIVATE

## 2018-02-24 PROCEDURE — 74011250637 HC RX REV CODE- 250/637: Performed by: PHYSICIAN ASSISTANT

## 2018-02-24 RX ORDER — METHYLPREDNISOLONE 4 MG/1
TABLET ORAL
Status: DISCONTINUED | OUTPATIENT
Start: 2018-02-24 | End: 2018-02-26 | Stop reason: HOSPADM

## 2018-02-24 RX ORDER — GABAPENTIN 300 MG/1
300 CAPSULE ORAL 3 TIMES DAILY
Status: DISCONTINUED | OUTPATIENT
Start: 2018-02-24 | End: 2018-02-26 | Stop reason: HOSPADM

## 2018-02-24 RX ORDER — HYDROCODONE BITARTRATE AND ACETAMINOPHEN 10; 325 MG/1; MG/1
1-2 TABLET ORAL
Status: DISCONTINUED | OUTPATIENT
Start: 2018-02-24 | End: 2018-02-26 | Stop reason: HOSPADM

## 2018-02-24 RX ADMIN — BISACODYL 10 MG: 5 TABLET, COATED ORAL at 13:08

## 2018-02-24 RX ADMIN — HYDROCODONE BITARTRATE AND ACETAMINOPHEN 1 TABLET: 10; 325 TABLET ORAL at 09:13

## 2018-02-24 RX ADMIN — DIPHENHYDRAMINE HYDROCHLORIDE 12.5 MG: 50 INJECTION, SOLUTION INTRAMUSCULAR; INTRAVENOUS at 00:49

## 2018-02-24 RX ADMIN — METHYLPREDNISOLONE: 4 TABLET ORAL at 21:47

## 2018-02-24 RX ADMIN — HYDROCODONE BITARTRATE AND ACETAMINOPHEN 2 TABLET: 10; 325 TABLET ORAL at 21:25

## 2018-02-24 RX ADMIN — CELECOXIB 200 MG: 100 CAPSULE ORAL at 09:14

## 2018-02-24 RX ADMIN — INSULIN LISPRO 4 UNITS: 100 INJECTION, SOLUTION INTRAVENOUS; SUBCUTANEOUS at 13:11

## 2018-02-24 RX ADMIN — INSULIN LISPRO 6 UNITS: 100 INJECTION, SOLUTION INTRAVENOUS; SUBCUTANEOUS at 21:46

## 2018-02-24 RX ADMIN — METHYLPREDNISOLONE 12 MG: 4 TABLET ORAL at 13:08

## 2018-02-24 RX ADMIN — LEVOTHYROXINE SODIUM 125 MCG: 75 TABLET ORAL at 09:14

## 2018-02-24 RX ADMIN — HYDROCODONE BITARTRATE AND ACETAMINOPHEN 2 TABLET: 5; 325 TABLET ORAL at 04:18

## 2018-02-24 RX ADMIN — METHOCARBAMOL 750 MG: 750 TABLET ORAL at 02:27

## 2018-02-24 RX ADMIN — METHYLPREDNISOLONE 4 MG: 4 TABLET ORAL at 18:05

## 2018-02-24 RX ADMIN — AMITRIPTYLINE HYDROCHLORIDE 10 MG: 10 TABLET, FILM COATED ORAL at 21:26

## 2018-02-24 RX ADMIN — INSULIN LISPRO 2 UNITS: 100 INJECTION, SOLUTION INTRAVENOUS; SUBCUTANEOUS at 09:15

## 2018-02-24 RX ADMIN — POLYETHYLENE GLYCOL (3350) 17 G: 17 POWDER, FOR SOLUTION ORAL at 09:15

## 2018-02-24 RX ADMIN — Medication 10 ML: at 21:48

## 2018-02-24 RX ADMIN — CARVEDILOL 3.12 MG: 3.12 TABLET, FILM COATED ORAL at 17:26

## 2018-02-24 RX ADMIN — HYDROCODONE BITARTRATE AND ACETAMINOPHEN 1 TABLET: 10; 325 TABLET ORAL at 17:35

## 2018-02-24 RX ADMIN — Medication 10 ML: at 17:22

## 2018-02-24 RX ADMIN — GABAPENTIN 300 MG: 300 CAPSULE ORAL at 17:20

## 2018-02-24 RX ADMIN — TELMISARTAN 20 MG: 40 TABLET ORAL at 09:15

## 2018-02-24 RX ADMIN — DOCUSATE SODIUM 100 MG: 100 CAPSULE, LIQUID FILLED ORAL at 09:14

## 2018-02-24 RX ADMIN — INSULIN LISPRO 2 UNITS: 100 INJECTION, SOLUTION INTRAVENOUS; SUBCUTANEOUS at 17:22

## 2018-02-24 RX ADMIN — CARVEDILOL 3.12 MG: 3.12 TABLET, FILM COATED ORAL at 09:14

## 2018-02-24 RX ADMIN — GABAPENTIN 300 MG: 300 CAPSULE ORAL at 09:14

## 2018-02-24 RX ADMIN — GABAPENTIN 300 MG: 300 CAPSULE ORAL at 21:26

## 2018-02-24 NOTE — PROGRESS NOTES
Neurosurgery Progress Note;  S; continues to have pain in the left buttock and in the left leg. Back is sore. Blood sugars are a bit high but in the 200's. O: afebrile and stable vital signs.  Wound is clean and dry and function is 5.5 voiding and has had a small bm  A; reactive leg pain in diabetic  P: medrol dose tomasz neurontin increase opoid a bit

## 2018-02-24 NOTE — PROGRESS NOTES
Bedside shift change report given to Mt Jones RN (oncoming nurse) by Mike Marinelli RN (offgoing nurse). Report included the following information SBAR, Kardex, OR Summary, Intake/Output, MAR and Recent Results. Patient resting in bed, verbally aggressive to off-going and oncoming RN, demanding, using offensive language. 0000: Patient states, \"I don't want anyone in here until I wake up\", informed patient nurse will round hourly, patient upset. Patient condition stable, refusing Q4H VS.     0131: Patient states, \"Someone knocked on my door, I said no one was allowed in here\", informed patient he will be rounded on to insure his safety. Informed patient VS will need to be obtained prior to narcotic administration. Safety measures in place. 4841: Norco given, patient reports tingling, states MD in aware and symptom is not new for him.     0426: Assisted OOB, stood, voided 700 ml clear yellow urine. Bedside shift change report given to Isiah Brumfield RN (oncoming nurse) by Mt Jones RN (offgoing nurse). Report included the following information SBAR, Kardex, OR Summary, Intake/Output, MAR and Recent Results.

## 2018-02-24 NOTE — PROGRESS NOTES
Internal Medicine Progress Note    Patient's Name: Abdifatah Saleem  Admit Date: 2/20/2018  Length of Stay: 4      Assessment/Plan     Active Hospital Problems    Diagnosis Date Noted    Herniation of nucleus pulposus 02/20/2018    Adjacent segment disease with spinal stenosis 02/20/2018    Thyroid disease 02/20/2018    Sleep apnea 02/20/2018     Does not use CPAP. Never had sleep study      Hyperlipidemia 02/20/2018    Hypertension 02/20/2018    GERD (gastroesophageal reflux disease) 02/20/2018    DM type 2 (diabetes mellitus, type 2) ([facility] [address] 02/20/2018     - Diet and mobilization per primary team  - PT/OT  - Pain control PRN  - Steroids per neurosurg  - SSI and accuchecks  - BP in good range  - Dispo per primary  - Cont acceptable home medications for chronic conditions   - DVT protocol    I have personally reviewed all pertinent labs and films that have officially resulted over the last 24 hours. I have personally checked for all pending labs that are awaiting final results.     Subjective     Pt s/e @ bedside  Had episode of pain w/ radiculopathy down left leg similar to before surg this AM  Denies hip paresthesia or saddle anesthesia  Denies bowel or urinary incontinence  Doing a little better now  Had BM yesterday  Denies CP or SOB    Objective     Visit Vitals    /82 (BP 1 Location: Left arm, BP Patient Position: At rest)    Pulse 88    Temp 97.6 °F (36.4 °C)    Resp 18    Ht 5' 10\" (1.778 m)    Wt 87.5 kg (193 lb)    SpO2 95%    BMI 27.69 kg/m2       Physical Exam:  General Appearance: NAD, conversant  Lungs: CTA with normal respiratory effort  CV: RRR, no m/r/g  Abdomen: soft, non-tender, normal bowel sounds  Extremities: no cyanosis, no peripheral edema  Neuro: No focal deficits, motor/sensory relatively intact    Lab/Data Reviewed:  BMP: No results found for: NA, K, CL, CO2, AGAP, GLU, BUN, CREA, GFRAA, GFRNA  CBC: No results found for: WBC, HGB, HGBEXT, HCT, HCTEXT, PLT, PLTEXT, HGBEXT, HCTEXT, PLTEXT    Imaging Reviewed:  No results found.     Medications Reviewed:  Current Facility-Administered Medications   Medication Dose Route Frequency    gabapentin (NEURONTIN) capsule 300 mg  300 mg Oral TID    HYDROcodone-acetaminophen (NORCO)  mg tablet 1-2 Tab  1-2 Tab Oral Q4H PRN    methylPREDNISolone (MEDROL DOSEPACK) tablet   Oral 6-DAY DOSEPAK TAPER    celecoxib (CELEBREX) capsule 200 mg  200 mg Oral DAILY    polyethylene glycol (MIRALAX) packet 17 g  17 g Oral DAILY    docusate sodium (COLACE) capsule 100 mg  100 mg Oral DAILY    bisacodyl (DULCOLAX) suppository 10 mg  10 mg Rectal DAILY PRN    glucose chewable tablet 16 g  4 Tab Oral PRN    glucagon (GLUCAGEN) injection 1 mg  1 mg IntraMUSCular PRN    dextrose (D50W) injection syrg 12.5-25 g  25-50 mL IntraVENous PRN    insulin lispro (HUMALOG) injection   SubCUTAneous AC&HS    methocarbamol (ROBAXIN) tablet 750 mg  750 mg Oral QID PRN    bisacodyl (DULCOLAX) tablet 10 mg  10 mg Oral DAILY PRN    sodium chloride (NS) flush 5-10 mL  5-10 mL IntraVENous Q8H    sodium chloride (NS) flush 5-10 mL  5-10 mL IntraVENous PRN    acetaminophen (TYLENOL) tablet 650 mg  650 mg Oral Q4H PRN    naloxone (NARCAN) injection 0.4 mg  0.4 mg IntraVENous PRN    ondansetron (ZOFRAN) injection 4 mg  4 mg IntraVENous Q4H PRN    phenol throat spray (CHLORASEPTIC) 1 Spray  1 Spray Oral PRN    benzocaine-menthol (CEPACOL) lozenge 1 Lozenge  1 Lozenge Oral PRN    diphenhydrAMINE (BENADRYL) injection 12.5 mg  12.5 mg IntraVENous Q4H PRN    diphenhydrAMINE (BENADRYL) capsule 25 mg  25 mg Oral Q4H PRN    magnesium hydroxide (MILK OF MAGNESIA) 400 mg/5 mL oral suspension 30 mL  30 mL Oral DAILY PRN    amitriptyline (ELAVIL) tablet 10 mg  10 mg Oral QHS    carvedilol (COREG) tablet 3.125 mg  3.125 mg Oral BID WITH MEALS    levothyroxine (SYNTHROID) tablet 125 mcg  125 mcg Oral ACB    telmisartan (MICARDIS) tablet 20 mg  20 mg Oral DAILY Ramy Prater, DO  Internal Medicine, Hospitalist  Pager: [phone]

## 2018-02-24 NOTE — PROGRESS NOTES
Problem: Falls - Risk of  Goal: *Absence of Falls  Document Mai Fall Risk and appropriate interventions in the flowsheet.    Outcome: Progressing Towards Goal  Fall Risk Interventions:  Mobility Interventions: Communicate number of staff needed for ambulation/transfer, Patient to call before getting OOB, Utilize walker, cane, or other assitive device         Medication Interventions: Patient to call before getting OOB, Teach patient to arise slowly    Elimination Interventions: Call light in reach, Patient to call for help with toileting needs, Toileting schedule/hourly rounds, Urinal in reach

## 2018-02-24 NOTE — PROGRESS NOTES
0585 informed by [facility] in pharmacy tech will bring steroid pack up in about an hour     1313 patient sitting in chair on telephone, call bell within reach,no acute distress noted, meds given     [id number] patient in bed, meds given, call bell within reach. No acute distress noted    1929 Bedside and Verbal shift change report given to Gibran Garcia (oncoming nurse) by Noé Neil RN   (offgoing nurse). Report included the following information SBAR, Kardex and MAR.

## 2018-02-25 LAB
GLUCOSE BLD STRIP.AUTO-MCNC: 150 MG/DL (ref 70–110)
GLUCOSE BLD STRIP.AUTO-MCNC: 236 MG/DL (ref 70–110)

## 2018-02-25 PROCEDURE — 74011250637 HC RX REV CODE- 250/637: Performed by: NURSE PRACTITIONER

## 2018-02-25 PROCEDURE — 74011250637 HC RX REV CODE- 250/637: Performed by: PHYSICIAN ASSISTANT

## 2018-02-25 PROCEDURE — 74011250637 HC RX REV CODE- 250/637: Performed by: NEUROLOGICAL SURGERY

## 2018-02-25 PROCEDURE — 97116 GAIT TRAINING THERAPY: CPT

## 2018-02-25 PROCEDURE — 82962 GLUCOSE BLOOD TEST: CPT

## 2018-02-25 PROCEDURE — 65270000029 HC RM PRIVATE

## 2018-02-25 RX ADMIN — METHYLPREDNISOLONE 4 MG: 4 TABLET ORAL at 18:12

## 2018-02-25 RX ADMIN — AMITRIPTYLINE HYDROCHLORIDE 10 MG: 10 TABLET, FILM COATED ORAL at 22:15

## 2018-02-25 RX ADMIN — Medication 10 ML: at 22:28

## 2018-02-25 RX ADMIN — METHYLPREDNISOLONE 4 MG: 4 TABLET ORAL at 13:19

## 2018-02-25 RX ADMIN — METHYLPREDNISOLONE: 4 TABLET ORAL at 22:15

## 2018-02-25 RX ADMIN — CARVEDILOL 3.12 MG: 3.12 TABLET, FILM COATED ORAL at 08:56

## 2018-02-25 RX ADMIN — CELECOXIB 200 MG: 100 CAPSULE ORAL at 08:56

## 2018-02-25 RX ADMIN — GABAPENTIN 300 MG: 300 CAPSULE ORAL at 08:55

## 2018-02-25 RX ADMIN — LEVOTHYROXINE SODIUM 125 MCG: 75 TABLET ORAL at 08:55

## 2018-02-25 RX ADMIN — METHYLPREDNISOLONE: 4 TABLET ORAL at 06:23

## 2018-02-25 RX ADMIN — HYDROCODONE BITARTRATE AND ACETAMINOPHEN 2 TABLET: 10; 325 TABLET ORAL at 02:18

## 2018-02-25 RX ADMIN — BISACODYL 10 MG: 10 SUPPOSITORY RECTAL at 13:21

## 2018-02-25 RX ADMIN — DOCUSATE SODIUM 100 MG: 100 CAPSULE, LIQUID FILLED ORAL at 08:56

## 2018-02-25 RX ADMIN — GABAPENTIN 300 MG: 300 CAPSULE ORAL at 22:15

## 2018-02-25 RX ADMIN — POLYETHYLENE GLYCOL (3350) 17 G: 17 POWDER, FOR SOLUTION ORAL at 08:56

## 2018-02-25 RX ADMIN — Medication 10 ML: at 06:25

## 2018-02-25 RX ADMIN — GABAPENTIN 300 MG: 300 CAPSULE ORAL at 16:54

## 2018-02-25 RX ADMIN — TELMISARTAN 20 MG: 40 TABLET ORAL at 08:55

## 2018-02-25 RX ADMIN — CARVEDILOL 3.12 MG: 3.12 TABLET, FILM COATED ORAL at 16:54

## 2018-02-25 RX ADMIN — MAGNESIUM HYDROXIDE 30 ML: 400 SUSPENSION ORAL at 13:19

## 2018-02-25 RX ADMIN — HYDROCODONE BITARTRATE AND ACETAMINOPHEN 1 TABLET: 10; 325 TABLET ORAL at 21:11

## 2018-02-25 NOTE — PROGRESS NOTES
1929:  Bedside report received from Ryanne White RN. Patient in bed resting comfortably. No signs of distress. Complaints of pain 5/10. Surgical site, CDI. Call bell within reach. Will continue to monitor. 2125:  Patient medicated with scheduled meds. Complaints of pain 7/10.      0218:  Patient SCD machine was beeping. Nurse quieted machine; emptied urinal and medicated with pain medicine. Bedside and Verbal shift change report given to Ryanne White RN (oncoming nurse) by Elizabeth Rodriguez RN (offgoing nurse). Report included the following information SBAR, Kardex, Procedure Summary, MAR, Recent Results and Med Rec Status.

## 2018-02-25 NOTE — PROGRESS NOTES
Problem: Mobility Impaired (Adult and Pediatric)  Goal: *Acute Goals and Plan of Care (Insert Text)  Physical Therapy Goals  Initiated 2/21/2018 and to be accomplished within 7 day(s)  1. Patient will ambulate with modified independence for 500 feet with the least restrictive device. 2.  Patient will ascend/descend 10 stairs with handrail(s) with modified independence. physical Therapy TREATMENT    Patient: Romana Boards (36 y.o. male)  Date: 2/25/2018  Diagnosis: l4-5 hnp adjacent segment disease  m96.3  Herniation of nucleus pulposus  Adjacent segment disease with spinal stenosis Herniation of nucleus pulposus  Procedure(s) (LRB):  lumbar 4,5  TRANSFORAMINAL LUMBAR INTERBODY FUSION with pedicle screw placement , left iliac crest needle aspiration and exploration of hardware and fusion, hardware removal (N/A) 5 Days Post-Op  Precautions: Fall  Chart, physical therapy assessment, plan of care and goals were reviewed. PLOF: Independent    ASSESSMENT:  Up in chair on initial contact. Agreeable to working with therapy. Supervision sit to stand. Supervision gait with rolling walker on level surface and supervision going up and down 5 steps holding onto 1 rail. Patient able to recite Lumbar Precaution (BLT) without prompt. EDUCATION: Stair training (up with the good leg first and down with the bad leg first)  Progression toward goals:        Improving appropriately and progressing toward goals                   PLAN:  Patient continues to benefit from skilled intervention to address the above impairments. Continue treatment per established plan of care. Discharge Recommendations:  None  Further Equipment Recommendations for Discharge:  rolling walker     SUBJECTIVE:   Patient stated OK to stair training. \"    OBJECTIVE DATA SUMMARY:   Critical Behavior:  Neurologic State: Alert  Orientation Level: Disoriented to person, Oriented X4  Cognition: Appropriate decision making, Appropriate for age attention/concentration, Appropriate safety awareness, Follows commands  Safety/Judgement: Awareness of environment, Fall prevention    G CODE:Mobility  Current  CL= 60-79%. The severity rating is based on the Other 100 Delmy Masters Standing Balance Scale  0: Pt performs 25% or less of standing activity (Max assist) CN, 100% impaired. 1: Pt supports self with upper extremities but requires therapist assistance. Pt performs 25-50% of effort (Mod assist) CM, 80% to <100% impaired. 1+: Pt supports self with upper extremities but requires therapist assistance. Pt performs >50% effort. (Min assist). CL, 60% to <80% impaired. 2: Pt supports self independently with both upper extremities (walker, crutches, parallel bars). CL, 60% to <80% impaired. 2+: Pt support self independently with 1 upper extremity (cane, crutch, 1 parallel bar). CK, 40% to <60% impaired. 3: Pt stands without upper extremity support for up to 30 seconds. CK, 40% to <60% impaired. 3+: Pt stands without upper extremity support for 30 seconds or greater. CJ, 20% to <40% impaired. 4: Pt independently moves and returns center of gravity 1-2 inches in one plane. CJ, 20% to <40% impaired. 4+: Pt independently moves and returns center of gravity 1-2 inches in multiple planes. CI, 1% to <20% impaired. 5: Pt independently moves and returns center of gravity in all planes greater than 2 inches. CH, 0% impaired.     Functional Mobility Training:  Bed Mobility:  Supine to Sit:  (Up in chair on initial contact)  Transfers:  Sit to Stand: Supervision  Stand to Sit: Supervision  Balance:  Sitting: Intact  Standing: Impaired  Standing - Static: Fair (Plus)  Standing - Dynamic : Fair  Ambulation/Gait Training:  Distance (ft): 250 Feet (ft)  Assistive Device: Walker, rolling  Ambulation - Level of Assistance: Supervision  Gait Abnormalities: Antalgic              Stairs:  Practice going up and down 5 steps with 1 rail with supervision.       Vital Signs  Temp: 97.9 °F (36.6 °C)     Pulse (Heart Rate): 71     BP: 112/75     Resp Rate: 16     O2 Sat (%): 92 %     Pain:  Pre treatment pain level: 3  Post treatment pain level: 3  Pain Scale 1: Numeric (0 - 10)  Pain Intensity 1: 3  Pain Location 1: Back  Pain Orientation 1: Posterior  Pain Description 1: Aching  Pain Intervention(s) 1: Declines     Activity Tolerance:  Good     After treatment:   Patient left in no apparent distress sitting up in chair  Call bell left within reach  Nursing notified    Recommendations for nursing:  Written on communication board: May ambulate in hallway with assist of 1  Verbally communicated to: nurse Hilary Arthur, PT   Time Calculation: 12 mins

## 2018-02-25 NOTE — PROGRESS NOTES
0212 assessment complete, am meds given, patient in bed, call bell within reach , no acute distress noted. Patient denies numbness and tingling. Patient decline pain medication at this time. Patient instructed to notify nurse when pain medication is needed. Patient refused insulin administration, teaching given, patient made aware BG results 150, stating \"it's almost 149, I don't want insulin, no more shots. \"    [phone] spoke to pharmacy in regards to enema, informed tech will bring up on next round. Patient refused to have accu checks performed stating \"no more sticking me with needles to check my blood sugar, I'm done for today. \"    [id number] attempted to adminster hog enema, explained procedure to patient, patient refused stating \"I am all familiar with that process, I don't want that one, give me the fleet. \" MD Ashleigh Cazares contacted, awaiting call back     [phone]  MD Ashleigh Cazares made aware, ok to give dulcolax suppository, patient made aware ok with dulcolax suppository and also requested milk of mag    1327 milk of mag and suppository given, patient tolerated well, patient decline pain medication at this time    1400 medium formed bowel movement noted, patient reported \"I feel better\"    6418  Meds given, patient sitting in chair, call bell within reach, no acute distress noted     2013 Bedside and Verbal shift change report given to [address] (oncoming nurse) by Lorenza Merlos RN   (offgoing nurse). Report included the following information SBAR, Kardex and MAR.

## 2018-02-25 NOTE — PROGRESS NOTES
Neurosurgery Progress NOte;  S: still minimal bm, leg pain is somewhat better with the neurontin and the medrol and higher dose of norco  O; afebrile and vitals are stable. Wound is clean and dry. Function is 5.5 abd slightly distended.    A; somewhat better not quite ready for discharge  P; hog enema today possible dc in the morning

## 2018-02-25 NOTE — PROGRESS NOTES
Problem: Falls - Risk of  Goal: *Absence of Falls  Document Mai Fall Risk and appropriate interventions in the flowsheet.    Outcome: Progressing Towards Goal  Fall Risk Interventions:  Mobility Interventions: Communicate number of staff needed for ambulation/transfer, Patient to call before getting OOB, Utilize walker, cane, or other assitive device         Medication Interventions: Patient to call before getting OOB, Teach patient to arise slowly    Elimination Interventions: Call light in reach, Patient to call for help with toileting needs, Urinal in reach, Toilet paper/wipes in reach

## 2018-02-25 NOTE — PROGRESS NOTES
Internal Medicine Progress Note    Patient's Name: Roderick Esparza  Admit Date: 2/20/2018  Length of Stay: 5      Assessment/Plan     Active Hospital Problems    Diagnosis Date Noted    Herniation of nucleus pulposus 02/20/2018    Adjacent segment disease with spinal stenosis 02/20/2018    Thyroid disease 02/20/2018    Sleep apnea 02/20/2018     Does not use CPAP. Never had sleep study      Hyperlipidemia 02/20/2018    Hypertension 02/20/2018    GERD (gastroesophageal reflux disease) 02/20/2018    DM type 2 (diabetes mellitus, type 2) ([facility] [address] 02/20/2018     - Diet and mobilization per primary team  - PT/OT  - Pain control PRN  - Steroids per neurosurg  - SSI and accuchecks  - BP in good range  - Dispo per primary  - Cont acceptable home medications for chronic conditions   - DVT protocol    I have personally reviewed all pertinent labs and films that have officially resulted over the last 24 hours. I have personally checked for all pending labs that are awaiting final results. Subjective     Pt s/e @ bedside  Pain improved  Having BMs  Denies CP or SOB    Objective     Visit Vitals    /79 (BP 1 Location: Left arm, BP Patient Position: At rest)    Pulse 79    Temp 97.8 °F (36.6 °C)    Resp 16    Ht 5' 10\" (1.778 m)    Wt 87.5 kg (193 lb)    SpO2 94%    BMI 27.69 kg/m2       Physical Exam:  General Appearance: NAD, conversant  Lungs: CTA with normal respiratory effort  CV: RRR, no m/r/g  Abdomen: soft, non-tender, normal bowel sounds  Extremities: no cyanosis, no peripheral edema  Neuro: No focal deficits, motor/sensory relatively intact    Lab/Data Reviewed:  BMP: No results found for: NA, K, CL, CO2, AGAP, GLU, BUN, CREA, GFRAA, GFRNA  CBC: No results found for: WBC, HGB, HGBEXT, HCT, HCTEXT, PLT, PLTEXT, HGBEXT, HCTEXT, PLTEXT    Imaging Reviewed:  No results found.     Medications Reviewed:  Current Facility-Administered Medications   Medication Dose Route Frequency    ned enema  500 mL Rectal ONCE    gabapentin (NEURONTIN) capsule 300 mg  300 mg Oral TID    HYDROcodone-acetaminophen (NORCO)  mg tablet 1-2 Tab  1-2 Tab Oral Q4H PRN    methylPREDNISolone (MEDROL DOSEPACK) tablet   Oral 6-DAY DOSEPAK TAPER    celecoxib (CELEBREX) capsule 200 mg  200 mg Oral DAILY    polyethylene glycol (MIRALAX) packet 17 g  17 g Oral DAILY    docusate sodium (COLACE) capsule 100 mg  100 mg Oral DAILY    bisacodyl (DULCOLAX) suppository 10 mg  10 mg Rectal DAILY PRN    glucose chewable tablet 16 g  4 Tab Oral PRN    glucagon (GLUCAGEN) injection 1 mg  1 mg IntraMUSCular PRN    dextrose (D50W) injection syrg 12.5-25 g  25-50 mL IntraVENous PRN    insulin lispro (HUMALOG) injection   SubCUTAneous AC&HS    methocarbamol (ROBAXIN) tablet 750 mg  750 mg Oral QID PRN    bisacodyl (DULCOLAX) tablet 10 mg  10 mg Oral DAILY PRN    sodium chloride (NS) flush 5-10 mL  5-10 mL IntraVENous Q8H    sodium chloride (NS) flush 5-10 mL  5-10 mL IntraVENous PRN    acetaminophen (TYLENOL) tablet 650 mg  650 mg Oral Q4H PRN    naloxone (NARCAN) injection 0.4 mg  0.4 mg IntraVENous PRN    ondansetron (ZOFRAN) injection 4 mg  4 mg IntraVENous Q4H PRN    phenol throat spray (CHLORASEPTIC) 1 Spray  1 Spray Oral PRN    benzocaine-menthol (CEPACOL) lozenge 1 Lozenge  1 Lozenge Oral PRN    diphenhydrAMINE (BENADRYL) injection 12.5 mg  12.5 mg IntraVENous Q4H PRN    diphenhydrAMINE (BENADRYL) capsule 25 mg  25 mg Oral Q4H PRN    magnesium hydroxide (MILK OF MAGNESIA) 400 mg/5 mL oral suspension 30 mL  30 mL Oral DAILY PRN    amitriptyline (ELAVIL) tablet 10 mg  10 mg Oral QHS    carvedilol (COREG) tablet 3.125 mg  3.125 mg Oral BID WITH MEALS    levothyroxine (SYNTHROID) tablet 125 mcg  125 mcg Oral ACB    telmisartan (MICARDIS) tablet 20 mg  20 mg Oral DAILY           Karly Herrera DO  Internal Medicine, Hospitalist  Pager: [phone]  [facility]

## 2018-02-26 VITALS
HEART RATE: 78 BPM | WEIGHT: 193 LBS | RESPIRATION RATE: 18 BRPM | DIASTOLIC BLOOD PRESSURE: 99 MMHG | BODY MASS INDEX: 27.63 KG/M2 | OXYGEN SATURATION: 93 % | SYSTOLIC BLOOD PRESSURE: 156 MMHG | HEIGHT: 70 IN | TEMPERATURE: 97.3 F

## 2018-02-26 LAB — GLUCOSE BLD STRIP.AUTO-MCNC: 160 MG/DL (ref 70–110)

## 2018-02-26 PROCEDURE — 82962 GLUCOSE BLOOD TEST: CPT

## 2018-02-26 PROCEDURE — 74011250637 HC RX REV CODE- 250/637: Performed by: PHYSICIAN ASSISTANT

## 2018-02-26 PROCEDURE — 74011636637 HC RX REV CODE- 636/637: Performed by: HOSPITALIST

## 2018-02-26 PROCEDURE — 74011250637 HC RX REV CODE- 250/637: Performed by: NEUROLOGICAL SURGERY

## 2018-02-26 PROCEDURE — 74011250637 HC RX REV CODE- 250/637: Performed by: NURSE PRACTITIONER

## 2018-02-26 RX ORDER — METHYLPREDNISOLONE 4 MG/1
TABLET ORAL
Qty: 1 DOSE PACK | Refills: 0 | Status: SHIPPED | OUTPATIENT
Start: 2018-02-26 | End: 2018-07-16

## 2018-02-26 RX ORDER — CELECOXIB 200 MG/1
200 CAPSULE ORAL DAILY
Qty: 30 CAP | Refills: 2 | Status: SHIPPED | OUTPATIENT
Start: 2018-02-26 | End: 2018-05-27

## 2018-02-26 RX ORDER — HYDROCODONE BITARTRATE AND ACETAMINOPHEN 10; 325 MG/1; MG/1
1 TABLET ORAL
Qty: 30 TAB | Refills: 0 | Status: SHIPPED | OUTPATIENT
Start: 2018-02-26 | End: 2018-03-05

## 2018-02-26 RX ORDER — GABAPENTIN 300 MG/1
300 CAPSULE ORAL 3 TIMES DAILY
Qty: 90 CAP | Refills: 0 | Status: SHIPPED | OUTPATIENT
Start: 2018-02-26 | End: 2018-07-16

## 2018-02-26 RX ORDER — METHOCARBAMOL 750 MG/1
750 TABLET, FILM COATED ORAL
Qty: 30 TAB | Refills: 0 | Status: SHIPPED | OUTPATIENT
Start: 2018-02-26 | End: 2018-07-16

## 2018-02-26 RX ORDER — BISACODYL 5 MG
10 TABLET, DELAYED RELEASE (ENTERIC COATED) ORAL DAILY
Qty: 60 TAB | Refills: 0 | Status: SHIPPED | OUTPATIENT
Start: 2018-02-26 | End: 2018-07-16

## 2018-02-26 RX ADMIN — POLYETHYLENE GLYCOL (3350) 17 G: 17 POWDER, FOR SOLUTION ORAL at 08:49

## 2018-02-26 RX ADMIN — DOCUSATE SODIUM 100 MG: 100 CAPSULE, LIQUID FILLED ORAL at 08:47

## 2018-02-26 RX ADMIN — INSULIN LISPRO 2 UNITS: 100 INJECTION, SOLUTION INTRAVENOUS; SUBCUTANEOUS at 08:48

## 2018-02-26 RX ADMIN — GABAPENTIN 300 MG: 300 CAPSULE ORAL at 08:47

## 2018-02-26 RX ADMIN — LEVOTHYROXINE SODIUM 125 MCG: 75 TABLET ORAL at 08:46

## 2018-02-26 RX ADMIN — CARVEDILOL 3.12 MG: 3.12 TABLET, FILM COATED ORAL at 08:47

## 2018-02-26 RX ADMIN — Medication 10 ML: at 07:06

## 2018-02-26 RX ADMIN — HYDROCODONE BITARTRATE AND ACETAMINOPHEN 1 TABLET: 10; 325 TABLET ORAL at 10:09

## 2018-02-26 RX ADMIN — CELECOXIB 200 MG: 100 CAPSULE ORAL at 08:47

## 2018-02-26 RX ADMIN — METHYLPREDNISOLONE: 4 TABLET ORAL at 07:01

## 2018-02-26 RX ADMIN — TELMISARTAN 20 MG: 40 TABLET ORAL at 08:46

## 2018-02-26 NOTE — ROUTINE PROCESS
2340 Bedside and Verbal shift change  Received from Toshia Sierra RN (outgoing nurse), to [facility]  Pt. Is AOX 4. IV SL, Pt. No sign  pain at this time. Report included the following information SBAR, Kardex, Procedure Summary, Intake/Output, MAR, Recent Lab Results. Will resume care and monitor Pt. Condition. Pt. Has sign at the door, do not disturb until 0700 am.  Unable to assess Pt. Condition. Pt. Breathing normall. 0200  Pt. Breathing normally. 0300  Pt. Made no complaints. So sign of distress. 0500   Pt. Continues to be resting with eyes closed. Pt. Breathing normally. 0630  Pt. In bed eyes closed with sign at the door do not disturbed Pt.

## 2018-02-26 NOTE — PROGRESS NOTES
conducted a Follow up consultation and Spiritual Assessment for Destini Talley, who is a 67 y. o.,male. The  provided the following Interventions:  Continued the relationship of care and support. Patient was getting ready to go home. Listened empathically to patient's eagerness to leave. Offered assurance of prayer on patient's behalf. Chart reviewed. The following outcomes were achieved:  Patient expressed gratitude for pastoral care visit. Assessment:  There are no further spiritual or Confucianism issues which require Spiritual Care Services interventions at this time. Plan:  Chaplains will continue to follow and provide pastoral care as needed or requested. The Rev.  [address] [facility] Roma Arriaga [address]  [facility] [phone] / [facility] [phone]

## 2018-02-26 NOTE — PROGRESS NOTES
Internal Medicine Progress Note    Patient's Name: Quan Handy  Admit Date: 2/20/2018  Length of Stay: 6      Assessment/Plan     Active Hospital Problems    Diagnosis Date Noted    Herniation of nucleus pulposus 02/20/2018    Adjacent segment disease with spinal stenosis 02/20/2018    Thyroid disease 02/20/2018    Sleep apnea 02/20/2018     Does not use CPAP. Never had sleep study      Hyperlipidemia 02/20/2018    Hypertension 02/20/2018    GERD (gastroesophageal reflux disease) 02/20/2018    DM type 2 (diabetes mellitus, type 2) ([facility] [address] 02/20/2018     - Diet and mobilization per primary team  - PT/OT  - Pain control PRN  - Steroids per neurosurg  - SSI and accuchecks  - BP in good range  - Dispo per primary  - Cont acceptable home medications for chronic conditions   - DVT protocol    I have personally reviewed all pertinent labs and films that have officially resulted over the last 24 hours. I have personally checked for all pending labs that are awaiting final results. Subjective     Pt s/e @ bedside  No complaints today  Denies CP or SOB    Objective     Visit Vitals    BP (!) 156/99    Pulse 78    Temp 97.3 °F (36.3 °C)    Resp 18    Ht 5' 10\" (1.778 m)    Wt 87.5 kg (193 lb)    SpO2 93%    BMI 27.69 kg/m2       Physical Exam:  General Appearance: NAD, conversant  Lungs: CTA with normal respiratory effort  CV: RRR, no m/r/g  Abdomen: soft, non-tender, normal bowel sounds  Extremities: no cyanosis, no peripheral edema  Neuro: No focal deficits, motor/sensory relatively intact    Lab/Data Reviewed:  BMP: No results found for: NA, K, CL, CO2, AGAP, GLU, BUN, CREA, GFRAA, GFRNA  CBC: No results found for: WBC, HGB, HGBEXT, HCT, HCTEXT, PLT, PLTEXT, HGBEXT, HCTEXT, PLTEXT    Imaging Reviewed:  No results found.     Medications Reviewed:  Current Facility-Administered Medications   Medication Dose Route Frequency    gabapentin (NEURONTIN) capsule 300 mg  300 mg Oral TID    HYDROcodone-acetaminophen (NORCO)  mg tablet 1-2 Tab  1-2 Tab Oral Q4H PRN    methylPREDNISolone (MEDROL DOSEPACK) tablet   Oral 6-DAY DOSEPAK TAPER    celecoxib (CELEBREX) capsule 200 mg  200 mg Oral DAILY    polyethylene glycol (MIRALAX) packet 17 g  17 g Oral DAILY    docusate sodium (COLACE) capsule 100 mg  100 mg Oral DAILY    bisacodyl (DULCOLAX) suppository 10 mg  10 mg Rectal DAILY PRN    glucose chewable tablet 16 g  4 Tab Oral PRN    glucagon (GLUCAGEN) injection 1 mg  1 mg IntraMUSCular PRN    dextrose (D50W) injection syrg 12.5-25 g  25-50 mL IntraVENous PRN    insulin lispro (HUMALOG) injection   SubCUTAneous AC&HS    methocarbamol (ROBAXIN) tablet 750 mg  750 mg Oral QID PRN    bisacodyl (DULCOLAX) tablet 10 mg  10 mg Oral DAILY PRN    sodium chloride (NS) flush 5-10 mL  5-10 mL IntraVENous Q8H    sodium chloride (NS) flush 5-10 mL  5-10 mL IntraVENous PRN    acetaminophen (TYLENOL) tablet 650 mg  650 mg Oral Q4H PRN    naloxone (NARCAN) injection 0.4 mg  0.4 mg IntraVENous PRN    ondansetron (ZOFRAN) injection 4 mg  4 mg IntraVENous Q4H PRN    phenol throat spray (CHLORASEPTIC) 1 Spray  1 Spray Oral PRN    benzocaine-menthol (CEPACOL) lozenge 1 Lozenge  1 Lozenge Oral PRN    diphenhydrAMINE (BENADRYL) injection 12.5 mg  12.5 mg IntraVENous Q4H PRN    diphenhydrAMINE (BENADRYL) capsule 25 mg  25 mg Oral Q4H PRN    magnesium hydroxide (MILK OF MAGNESIA) 400 mg/5 mL oral suspension 30 mL  30 mL Oral DAILY PRN    amitriptyline (ELAVIL) tablet 10 mg  10 mg Oral QHS    carvedilol (COREG) tablet 3.125 mg  3.125 mg Oral BID WITH MEALS    levothyroxine (SYNTHROID) tablet 125 mcg  125 mcg Oral ACB    telmisartan (MICARDIS) tablet 20 mg  20 mg Oral DAILY     Current Outpatient Prescriptions   Medication Sig    bisacodyl (DULCOLAX) 5 mg EC tablet Take 2 Tabs by mouth daily.  celecoxib (CELEBREX) 200 mg capsule Take 1 Cap by mouth daily for 90 days.     gabapentin (NEURONTIN) 300 mg capsule Take 1 Cap by mouth three (3) times daily. Indications: NEUROPATHIC PAIN    HYDROcodone-acetaminophen (NORCO)  mg tablet Take 1 Tab by mouth every six (6) hours as needed for up to 7 days. Max Daily Amount: 4 Tabs. Indications: Pain    methocarbamol (ROBAXIN) 750 mg tablet Take 1 Tab by mouth four (4) times daily as needed. Indications: Muscle Spasm    methylPREDNISolone (MEDROL DOSEPACK) 4 mg tablet Start on Day#2    telmisartan (MICARDIS) 20 mg tablet Take 20 mg by mouth daily.  levothyroxine (SYNTHROID) 125 mcg tablet Take 125 mcg by mouth Daily (before breakfast).  carvedilol (COREG) 3.125 mg tablet Take 3.125 mg by mouth two (2) times daily (with meals).  amitriptyline (ELAVIL) 10 mg tablet Take 10 mg by mouth nightly.  metFORMIN (GLUCOPHAGE) 1,000 mg tablet Take 1,000 mg by mouth two (2) times a day.  B.infantis-B.ani-B.long-B.bifi (PROBIOTIC 4X) 10-15 mg TbEC Take  by mouth.  omega-3 fatty acids-vitamin e (FISH OIL) 1,000 mg cap Take 1 capsule by mouth.  temazepam (RESTORIL) 30 mg capsule Take  by mouth nightly as needed for Sleep.  glimepiride (AMARYL) 1 mg tablet Take 1 mg by mouth two (2) times a day.  omeprazole (PRILOSEC) 20 mg capsule Take 20 mg by mouth daily.  rosuvastatin (CRESTOR) 5 mg tablet Take  by mouth nightly.  multivitamins-minerals-lutein (CENTRUM SILVER) Tab Take  by mouth.  M-17/NETTLE/PUMPK/SAW PALMET (PROSTATE THERAPY PO) Take  by mouth.  aspirin 81 mg tablet Take 81 mg by mouth.              Jose Malcolm, DO  Internal Medicine, Hospitalist  Pager: [phone]

## 2018-02-26 NOTE — PROGRESS NOTES
2013 - received pt from Bre Marshall, REINIER for continuity of care. Pt resting in recliner. Assisted back to bed with walker. Pt rates pain 2/10. No distress noted. Pt requests not to be disturbed btwn 12:00A - 6:00A, advised that we need to round every hour, pt verbalized his understanding and asked that it be done quietly. 2112 - assessment completed. Pt is AOx4. VSS. Resting in bed, watching TV. Call bell at bedside within reach. No distress noted. 1130 - Bedside and Verbal shift change report given to Gianfranco Browning RN (oncoming nurse) by Joe Calderón RN(offgoing nurse).  Report included the following information SBAR, Kardex and [facility]

## 2018-02-26 NOTE — DISCHARGE SUMMARY
Discharge Summary     PATIENT ID:  Jessi Beard  67 y.o.  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 Preop leg pain immediately better. Had some inflammatory radiculopathy which prolonged his stay but he is much better today after steroids and eager to go home. As of d/c the patient is ambulating, tolerating p.o., and voiding without difficulty. PHYSICAL EXAM:  Visit Vitals    BP (!) 156/99    Pulse 78    Temp 97.3 °F (36.3 °C)    Resp 18    Ht 5' 10\" (1.778 m)    Wt 87.5 kg (193 lb)    SpO2 93%    BMI 27.69 kg/m2     Alert and appropriate. Motor and sensory function are grossly intact. The wound is clean/dry/intact and flat. Thighs and calves non-tender to deep palpation. RELAVENT LABS ( within last 24 hrs):    Recent Results (from the past 24 hour(s))   GLUCOSE, POC    Collection Time: 02/25/18 11:46 AM   Result Value Ref Range    Glucose (POC) 236 (H) 70 - 110 mg/dL   GLUCOSE, POC    Collection Time: 02/26/18  8:19 AM   Result Value Ref Range    Glucose (POC) 160 (H) 70 - 110 mg/dL         CONDITION AT DISCHARGE: Afrebrile  Ambulating  Eating, Drinking, Voiding  Improved  Pain control acceptable  Passing flatus  Stable  Wound clean, healing well    Current Discharge Medication List      START taking these medications    Details   bisacodyl (DULCOLAX) 5 mg EC tablet Take 2 Tabs by mouth daily. Qty: 60 Tab, Refills: 0      celecoxib (CELEBREX) 200 mg capsule Take 1 Cap by mouth daily for 90 days. Qty: 30 Cap, Refills: 2      gabapentin (NEURONTIN) 300 mg capsule Take 1 Cap by mouth three (3) times daily.  Indications: NEUROPATHIC PAIN  Qty: 90 Cap, Refills: 0      HYDROcodone-acetaminophen (NORCO)  mg tablet Take 1 Tab by mouth every six (6) hours as needed for up to 7 days. Max Daily Amount: 4 Tabs. Indications: Pain  Qty: 30 Tab, Refills: 0    Associated Diagnoses: Lumbar disc herniation with radiculopathy; Herniation of nucleus pulposus; Adjacent segment disease with spinal stenosis      methocarbamol (ROBAXIN) 750 mg tablet Take 1 Tab by mouth four (4) times daily as needed. Indications: Muscle Spasm  Qty: 30 Tab, Refills: 0      methylPREDNISolone (MEDROL DOSEPACK) 4 mg tablet Start on Day#2  Qty: 1 Dose Pack, Refills: 0         CONTINUE these medications which have NOT CHANGED    Details   telmisartan (MICARDIS) 20 mg tablet Take 20 mg by mouth daily. levothyroxine (SYNTHROID) 125 mcg tablet Take 125 mcg by mouth Daily (before breakfast). carvedilol (COREG) 3.125 mg tablet Take 3.125 mg by mouth two (2) times daily (with meals). amitriptyline (ELAVIL) 10 mg tablet Take 10 mg by mouth nightly. metFORMIN (GLUCOPHAGE) 1,000 mg tablet Take 1,000 mg by mouth two (2) times a day. B.infantis-B.ani-B.long-B.bifi (PROBIOTIC 4X) 10-15 mg TbEC Take  by mouth. omega-3 fatty acids-vitamin e (FISH OIL) 1,000 mg cap Take 1 capsule by mouth. temazepam (RESTORIL) 30 mg capsule Take  by mouth nightly as needed for Sleep.      glimepiride (AMARYL) 1 mg tablet Take 1 mg by mouth two (2) times a day. omeprazole (PRILOSEC) 20 mg capsule Take 20 mg by mouth daily. rosuvastatin (CRESTOR) 5 mg tablet Take  by mouth nightly. multivitamins-minerals-lutein (CENTRUM SILVER) Tab Take  by mouth. M-17/NETTLE/PUMPK/SAW PALMET (PROSTATE THERAPY PO) Take  by mouth. aspirin 81 mg tablet Take 81 mg by mouth.            STOP taking these medications       ibuprofen (MOTRIN) 800 mg tablet Comments:   Reason for Stopping:                 DISPOSITION:  Home   -F/u 4 weeks (the patient should call for the appointment)   -Activity instructions have been given    CONSULTANTS:  CT       PCP:  Edvin Baca MD, MD    DISCHARGING PHYSICIAN: Judy Hollis MD, MD

## 2018-02-26 NOTE — ROUTINE PROCESS
Verbal and bedside Shift changed report given to Pete Saunders RN (oncoming RN) on Pt. Condition. Report consisted of patients Situation, History, Activities, intake/output,  Background, Assessment and Recommendations(SBAR). Information from the following report(s) Kardex, order Summary, Lab results and MAR was reviewed with the receiving nurse. Opportunity for questions and clarification was provided.

## 2018-02-26 NOTE — PROGRESS NOTES
Discharge plan is to return  Home. Patient had previously refused to home care and declined [facility] [address]  Render Clear, RN

## 2018-03-06 NOTE — ANCILLARY DISCHARGE INSTRUCTIONS
[facility]  Discharge Phone Call       After-Care Discharge Phone Call Questions: NO ANSWER    Were you able to get your prescriptions filled? Comment:      [] Yes  []No    Comment if answer is \"No\"   Are you taking your medication(s) as your doctor ordered? Do you understand the purpose of your medications? Comment:    [] Yes  []No    Comment if answer is \"No\"   Are you taking any other medications that are not on the list?  Comment:      [] Yes  []No    Comment if answer is \"Yes\"   Do you have any questions about your medications? Are you aware of potential side effects? Comment:    [] Yes  []No    Comment if answer is \"Yes\"   Did you make your follow-up appointments (if the hospital did not do this before  discharge)? Comment:    [] Yes  []No    Comment if answer is \"No\"   Is there any reason you might not be able to keep your follow-up appointments? Comment:     [] Yes  []No    Comment if answer is \"Yes\"   Do you have any questions about your care plan? Are you aware of what health problems to be alert for? Comment:    [] Yes  []No    Comment if answer is \"Yes\"   Do you have a good understanding of how you should manage your health? Comment:    [] Yes  []No    Comment if answer is \"Yes\"   Do you know which symptoms to watch for that would mean you would need to call your doctor right away? Comment:      [] Yes  []No    Comment if answer is \"No\"   Do you have any questions about the follow up process or any instructions that we have provided? Comment:    [] Yes  []No    Comment if answer is \"Yes\"   Did staff take your preferences into account?         [] Yes  []No    Comment if answer is \"Yes\"

## 2021-09-15 PROBLEM — R05.9 COUGH: Status: ACTIVE | Noted: 2018-04-18

## 2021-09-15 PROBLEM — N20.0 NEPHROLITHIASIS: Status: ACTIVE | Noted: 2017-03-25

## 2021-09-15 PROBLEM — F32.0 CURRENT MILD EPISODE OF MAJOR DEPRESSIVE DISORDER (HCC): Status: ACTIVE | Noted: 2020-11-18

## 2021-09-15 PROBLEM — J43.8 OTHER EMPHYSEMA (HCC): Status: ACTIVE | Noted: 2020-01-30

## 2021-09-15 PROBLEM — M25.511 CHRONIC RIGHT SHOULDER PAIN: Status: ACTIVE | Noted: 2017-10-10

## 2021-09-15 PROBLEM — F45.8 BRUXISM: Status: ACTIVE | Noted: 2018-04-18

## 2021-09-15 PROBLEM — M46.1 SACROILIITIS (HCC): Status: ACTIVE | Noted: 2021-04-02

## 2021-09-15 PROBLEM — N32.81 OVERACTIVE BLADDER: Status: ACTIVE | Noted: 2021-04-02

## 2021-09-15 PROBLEM — G89.29 CHRONIC RIGHT SHOULDER PAIN: Status: ACTIVE | Noted: 2017-10-10

## 2021-09-15 PROBLEM — R53.82 CHRONIC FATIGUE: Status: ACTIVE | Noted: 2018-04-18
